# Patient Record
Sex: FEMALE | Race: BLACK OR AFRICAN AMERICAN | NOT HISPANIC OR LATINO | Employment: UNEMPLOYED | URBAN - METROPOLITAN AREA
[De-identification: names, ages, dates, MRNs, and addresses within clinical notes are randomized per-mention and may not be internally consistent; named-entity substitution may affect disease eponyms.]

---

## 2023-06-29 ENCOUNTER — OFFICE VISIT (OUTPATIENT)
Dept: PRIMARY CARE | Facility: CLINIC | Age: 43
End: 2023-06-29
Payer: COMMERCIAL

## 2023-06-29 VITALS
WEIGHT: 220 LBS | BODY MASS INDEX: 33.95 KG/M2 | HEART RATE: 90 BPM | OXYGEN SATURATION: 97 % | SYSTOLIC BLOOD PRESSURE: 115 MMHG | DIASTOLIC BLOOD PRESSURE: 69 MMHG

## 2023-06-29 DIAGNOSIS — Z76.89 ESTABLISHING CARE WITH NEW DOCTOR, ENCOUNTER FOR: Primary | ICD-10-CM

## 2023-06-29 DIAGNOSIS — E11.9 TYPE 2 DIABETES MELLITUS WITHOUT COMPLICATION, WITHOUT LONG-TERM CURRENT USE OF INSULIN (MULTI): ICD-10-CM

## 2023-06-29 DIAGNOSIS — Z12.11 ENCOUNTER FOR SCREENING FOR MALIGNANT NEOPLASM OF COLON: ICD-10-CM

## 2023-06-29 DIAGNOSIS — E11.39 TYPE 2 DIABETES MELLITUS WITH OTHER OPHTHALMIC COMPLICATION, WITHOUT LONG-TERM CURRENT USE OF INSULIN (MULTI): ICD-10-CM

## 2023-06-29 DIAGNOSIS — Z00.00 ANNUAL PHYSICAL EXAM: ICD-10-CM

## 2023-06-29 LAB
ALANINE AMINOTRANSFERASE (SGPT) (U/L) IN SER/PLAS: 11 U/L (ref 7–45)
ALBUMIN (G/DL) IN SER/PLAS: 3.4 G/DL (ref 3.4–5)
ALKALINE PHOSPHATASE (U/L) IN SER/PLAS: 72 U/L (ref 33–110)
ANION GAP IN SER/PLAS: 9 MMOL/L (ref 10–20)
ASPARTATE AMINOTRANSFERASE (SGOT) (U/L) IN SER/PLAS: 9 U/L (ref 9–39)
BILIRUBIN TOTAL (MG/DL) IN SER/PLAS: 0.8 MG/DL (ref 0–1.2)
CALCIUM (MG/DL) IN SER/PLAS: 9.1 MG/DL (ref 8.6–10.6)
CARBON DIOXIDE, TOTAL (MMOL/L) IN SER/PLAS: 30 MMOL/L (ref 21–32)
CHLORIDE (MMOL/L) IN SER/PLAS: 101 MMOL/L (ref 98–107)
CHOLESTEROL (MG/DL) IN SER/PLAS: 201 MG/DL (ref 0–199)
CHOLESTEROL IN HDL (MG/DL) IN SER/PLAS: 47.1 MG/DL
CHOLESTEROL/HDL RATIO: 4.3
CREATININE (MG/DL) IN SER/PLAS: 0.53 MG/DL (ref 0.5–1.05)
ERYTHROCYTE DISTRIBUTION WIDTH (RATIO) BY AUTOMATED COUNT: 12.5 % (ref 11.5–14.5)
ERYTHROCYTE MEAN CORPUSCULAR HEMOGLOBIN CONCENTRATION (G/DL) BY AUTOMATED: 33.2 G/DL (ref 32–36)
ERYTHROCYTE MEAN CORPUSCULAR VOLUME (FL) BY AUTOMATED COUNT: 100 FL (ref 80–100)
ERYTHROCYTES (10*6/UL) IN BLOOD BY AUTOMATED COUNT: 4.59 X10E12/L (ref 4–5.2)
GFR FEMALE: >90 ML/MIN/1.73M2
GLUCOSE (MG/DL) IN SER/PLAS: 318 MG/DL (ref 74–99)
HEMATOCRIT (%) IN BLOOD BY AUTOMATED COUNT: 45.8 % (ref 36–46)
HEMOGLOBIN (G/DL) IN BLOOD: 15.2 G/DL (ref 12–16)
LDL: 134 MG/DL (ref 0–99)
LEUKOCYTES (10*3/UL) IN BLOOD BY AUTOMATED COUNT: 11 X10E9/L (ref 4.4–11.3)
NRBC (PER 100 WBCS) BY AUTOMATED COUNT: 0 /100 WBC (ref 0–0)
PLATELETS (10*3/UL) IN BLOOD AUTOMATED COUNT: 357 X10E9/L (ref 150–450)
POC HEMOGLOBIN A1C: 12.6 % (ref 4.2–6.5)
POTASSIUM (MMOL/L) IN SER/PLAS: 4.6 MMOL/L (ref 3.5–5.3)
PROTEIN TOTAL: 5.7 G/DL (ref 6.4–8.2)
SODIUM (MMOL/L) IN SER/PLAS: 135 MMOL/L (ref 136–145)
THYROTROPIN (MIU/L) IN SER/PLAS BY DETECTION LIMIT <= 0.05 MIU/L: 0.85 MIU/L (ref 0.44–3.98)
TRIGLYCERIDE (MG/DL) IN SER/PLAS: 102 MG/DL (ref 0–149)
UREA NITROGEN (MG/DL) IN SER/PLAS: 7 MG/DL (ref 6–23)
VLDL: 20 MG/DL (ref 0–40)

## 2023-06-29 PROCEDURE — 80061 LIPID PANEL: CPT

## 2023-06-29 PROCEDURE — 3074F SYST BP LT 130 MM HG: CPT | Performed by: STUDENT IN AN ORGANIZED HEALTH CARE EDUCATION/TRAINING PROGRAM

## 2023-06-29 PROCEDURE — 83036 HEMOGLOBIN GLYCOSYLATED A1C: CPT | Performed by: STUDENT IN AN ORGANIZED HEALTH CARE EDUCATION/TRAINING PROGRAM

## 2023-06-29 PROCEDURE — 84443 ASSAY THYROID STIM HORMONE: CPT

## 2023-06-29 PROCEDURE — 3078F DIAST BP <80 MM HG: CPT | Performed by: STUDENT IN AN ORGANIZED HEALTH CARE EDUCATION/TRAINING PROGRAM

## 2023-06-29 PROCEDURE — 85027 COMPLETE CBC AUTOMATED: CPT

## 2023-06-29 PROCEDURE — 80053 COMPREHEN METABOLIC PANEL: CPT

## 2023-06-29 PROCEDURE — 99214 OFFICE O/P EST MOD 30 MIN: CPT | Performed by: STUDENT IN AN ORGANIZED HEALTH CARE EDUCATION/TRAINING PROGRAM

## 2023-06-29 PROCEDURE — 99386 PREV VISIT NEW AGE 40-64: CPT | Performed by: STUDENT IN AN ORGANIZED HEALTH CARE EDUCATION/TRAINING PROGRAM

## 2023-06-29 RX ORDER — GLIPIZIDE 5 MG/1
1 TABLET, FILM COATED, EXTENDED RELEASE ORAL
COMMUNITY
Start: 2020-03-05 | End: 2023-06-30 | Stop reason: SDUPTHER

## 2023-06-29 ASSESSMENT — PATIENT HEALTH QUESTIONNAIRE - PHQ9
1. LITTLE INTEREST OR PLEASURE IN DOING THINGS: NOT AT ALL
SUM OF ALL RESPONSES TO PHQ9 QUESTIONS 1 AND 2: 0
2. FEELING DOWN, DEPRESSED OR HOPELESS: NOT AT ALL

## 2023-06-29 NOTE — PROGRESS NOTES
Subjective   Patient ID: Albert Gonzales is a 42 y.o. female who presents for Med Refill.    HPI     Albert Gonzales is a 42 y.o. female who is presenting for annual physical exam.     Last  Visit:   Reported Health: Good    Dental, Vision, Hearing:  Regular dental visits: no  - Brushes teeth 2 times per day  - Flosses? no  Vision problems: yes  - Wears glasses or contacts? no  - Last eye exam:  scheduled  Hearing loss: no    Immunization status:  Up to date: yes    Lifestyle:  Healthy diet: yes  Regular exercise: yes  - Exercise frequency:   - Type of exercise: walking, and stairs  Alcohol: yes socially  Tobacco: yes 1/2 ppd  Drugs: yes marijuana, rarely    Reproductive Health:  Menstrual problems: no  - LMP:  2023   Sexually active: yes  Contraception:  none    Pregnancy History:   :   Parity:   - Full term: 2  - Premature: 0  - (s): 3  - Livin  - AB Induced:2  - AB Spont:1  - Ectopic: 0   - Multiple births:0  1 child high risk with gestational diabetes  Cervical Cancer Screening:  Last pap: unknown date more than 5 years  ~  History of abnormal pap smear? no  Breast Cancer Screening:  Last mammogram:  n/a  Colorectal Cancer Screening:  Last colonoscopy or Cologuard:  n/a  family history of colon cancer  Metabolic Screening:  Lipid profile:   Glucose screen:       Previous History  Past Medical History:   Diagnosis Date    Chlamydial infection, unspecified     Chlamydia    Complete or unspecified spontaneous  without complication         Maternal care for other (suspected) fetal abnormality and damage, fetal genitourinary anomalies, not applicable or unspecified     Ultrasound recheck of fetal pyelectasis, antepartum    Other conditions influencing health status     History of pregnancy    Personal history of gestational diabetes     History of gestational diabetes    Personal history of other complications of pregnancy, childbirth and the puerperium     History  of spontaneous     Personal history of other endocrine, nutritional and metabolic disease     History of obesity    Personal history of other infectious and parasitic diseases     History of sexually transmitted disease    Personal history of other infectious and parasitic diseases     History of trichomoniasis    Personal history of other specified conditions     History of headache    Smoking (tobacco) complicating pregnancy, unspecified trimester     Tobacco use in pregnancy     Past Surgical History:   Procedure Laterality Date    OTHER SURGICAL HISTORY  2014    Laparoscopy With Occlusion Of Oviducts By Device     Social History     Tobacco Use    Smoking status: Every Day     Types: Cigarettes    Smokeless tobacco: Never   Substance Use Topics    Alcohol use: Yes    Drug use: Yes     Types: Marijuana     No family history on file.  Allergies   Allergen Reactions    Vancomycin Hives and Unknown     Current Outpatient Medications   Medication Instructions    glipiZIDE XL (Glucotrol XL) 5 mg 24 hr tablet 1 tablet, oral, Daily with breakfast             Review of Systems   Eyes:         Vision loss       Objective   /69   Pulse 90   Wt 99.8 kg (220 lb)   SpO2 97%   BMI 33.95 kg/m²     Physical Exam  Constitutional:       Appearance: She is obese.   HENT:      Head: Normocephalic and atraumatic.      Right Ear: Tympanic membrane, ear canal and external ear normal.      Left Ear: Tympanic membrane, ear canal and external ear normal.      Nose: Nose normal. No congestion or rhinorrhea.      Mouth/Throat:      Mouth: Mucous membranes are moist.      Pharynx: Oropharynx is clear.   Eyes:      General: No scleral icterus.     Extraocular Movements: Extraocular movements intact.      Conjunctiva/sclera: Conjunctivae normal.      Comments: Xanthomas on bilateral eyes   Cardiovascular:      Rate and Rhythm: Normal rate and regular rhythm.      Pulses: Normal pulses.      Heart sounds: Normal heart  sounds.   Pulmonary:      Effort: Pulmonary effort is normal.      Breath sounds: Normal breath sounds.   Abdominal:      General: Abdomen is flat.      Palpations: Abdomen is soft.   Musculoskeletal:         General: Normal range of motion.      Cervical back: Normal range of motion and neck supple.   Skin:     General: Skin is warm and dry.      Capillary Refill: Capillary refill takes less than 2 seconds.   Neurological:      General: No focal deficit present.      Mental Status: She is alert and oriented to person, place, and time. Mental status is at baseline.   Psychiatric:         Mood and Affect: Mood normal.         Behavior: Behavior normal.         Thought Content: Thought content normal.         Judgment: Judgment normal.         Assessment/Plan   Diagnoses and all orders for this visit:  Establishing care with new doctor, encounter for  Annual physical exam  -     CBC; Future  -     Comprehensive Metabolic Panel; Future  -     TSH with reflex to Free T4 if abnormal; Future  -     Lipid Panel; Future  Type 2 diabetes mellitus without complication, without long-term current use of insulin (CMS/[facility])  -     POCT glycosylated hemoglobin (Hb A1C) manually resulted  -     Comprehensive Metabolic Panel; Future  -     Lipid Panel; Future  -     Referral to Ophthalmology; Future  -     Referral to Podiatry; Future  -     Referral to Endocrinology; Future  Encounter for screening for malignant neoplasm of colon  -     Cologuard® colon cancer screening; Future  - family history of colon cancer in mother 3 years ago    IO A1c 12.6, uncontrolled diabetes  Given Jardiance 25mg 2 week sample supply   Given freestyle delphine samples today    TDAP due in December 2023  Pap test due to schedule with us    I have personally reviewed all available pertinent labs, imaging, and consult notes with the patient.   All questions and concerns were addressed. Patient verbalizes understanding instructions and agrees with established  plan of care.   Patient seen and discussed with Dr. Edd Sewell MD

## 2023-06-30 RX ORDER — FLASH GLUCOSE SENSOR
KIT MISCELLANEOUS
Qty: 4 EACH | Refills: 1 | Status: SHIPPED | OUTPATIENT
Start: 2023-06-30 | End: 2023-07-21 | Stop reason: SDUPTHER

## 2023-06-30 RX ORDER — GLIPIZIDE 5 MG/1
5 TABLET, FILM COATED, EXTENDED RELEASE ORAL
Qty: 90 TABLET | Refills: 1 | Status: SHIPPED | OUTPATIENT
Start: 2023-06-30 | End: 2023-07-21 | Stop reason: SINTOL

## 2023-06-30 NOTE — ADDENDUM NOTE
Addended by: ALLYSSA VALVERDE on: 6/30/2023 12:22 PM     Modules accepted: Orders, Level of Service

## 2023-07-07 ENCOUNTER — OFFICE VISIT (OUTPATIENT)
Dept: PRIMARY CARE | Facility: CLINIC | Age: 43
End: 2023-07-07
Payer: COMMERCIAL

## 2023-07-07 VITALS
DIASTOLIC BLOOD PRESSURE: 72 MMHG | HEIGHT: 67 IN | BODY MASS INDEX: 34.21 KG/M2 | SYSTOLIC BLOOD PRESSURE: 104 MMHG | WEIGHT: 218 LBS | OXYGEN SATURATION: 99 % | HEART RATE: 84 BPM

## 2023-07-07 DIAGNOSIS — E11.39 TYPE 2 DIABETES MELLITUS WITH OTHER OPHTHALMIC COMPLICATION, WITHOUT LONG-TERM CURRENT USE OF INSULIN (MULTI): Primary | ICD-10-CM

## 2023-07-07 PROCEDURE — 99214 OFFICE O/P EST MOD 30 MIN: CPT

## 2023-07-07 PROCEDURE — 3074F SYST BP LT 130 MM HG: CPT

## 2023-07-07 PROCEDURE — 3078F DIAST BP <80 MM HG: CPT

## 2023-07-07 RX ORDER — METFORMIN HYDROCHLORIDE 500 MG/1
1000 TABLET, EXTENDED RELEASE ORAL 2 TIMES DAILY
Qty: 360 TABLET | Refills: 0 | Status: SHIPPED | OUTPATIENT
Start: 2023-07-07 | End: 2023-10-03 | Stop reason: SDUPTHER

## 2023-07-07 RX ORDER — ROSUVASTATIN CALCIUM 5 MG/1
5 TABLET, COATED ORAL DAILY
Qty: 30 TABLET | Refills: 1 | Status: SHIPPED | OUTPATIENT
Start: 2023-07-07 | End: 2023-08-01

## 2023-07-07 NOTE — PROGRESS NOTES
"Subjective   Patient ID: Albert Gonzales is a 42 y.o. female who presents for lab follow up and DM2 follow up    HPI     Jardiance gave her yeast infx, took OTC for it   Samples 4 left. Is willing to try it against for another trial.  Cologuard refused without medical reasoning      Review of Systems   Genitourinary:         Yeast infx       Objective   /72 (BP Location: Right arm, Patient Position: Sitting)   Pulse 84   Ht 1.702 m (5' 7\")   Wt 98.9 kg (218 lb)   SpO2 99%   BMI 34.14 kg/m²     Physical Exam  Constitutional:       Appearance: Normal appearance.   HENT:      Head: Normocephalic and atraumatic.      Right Ear: External ear normal.      Left Ear: External ear normal.      Nose: Nose normal.   Eyes:      Extraocular Movements: Extraocular movements intact.      Conjunctiva/sclera: Conjunctivae normal.   Cardiovascular:      Rate and Rhythm: Normal rate and regular rhythm.      Pulses: Normal pulses.      Heart sounds: Normal heart sounds.   Pulmonary:      Effort: Pulmonary effort is normal.      Breath sounds: Normal breath sounds.   Musculoskeletal:         General: Normal range of motion.      Cervical back: Normal range of motion.   Skin:     Capillary Refill: Capillary refill takes less than 2 seconds.   Neurological:      General: No focal deficit present.      Mental Status: She is alert and oriented to person, place, and time.   Psychiatric:         Mood and Affect: Mood normal.         Behavior: Behavior normal.         Thought Content: Thought content normal.         Judgment: Judgment normal.         Assessment/Plan   Diagnoses and all orders for this visit:  Type 2 diabetes mellitus with other ophthalmic complication, without long-term current use of insulin (CMS/[facility])  -     empagliflozin (Jardiance) 25 mg; Take 1 tablet (25 mg) by mouth once daily.  -     metFORMIN XR (Glucophage-XR) 500 mg 24 hr tablet; Take 2 tablets (1,000 mg) by mouth 2 times a day. Do not crush, chew, or " split.  -     rosuvastatin (Crestor) 5 mg tablet; Take 1 tablet (5 mg) by mouth once daily.  Discussed the risk vs benefits with jardiance, she is willing to try for another two weeks. Should she have a yeast infection again we will discontinue.  Metformin will be started today, continue the glipizide.  Crestor started today due to DM2 diagnosis and elevated LDL  RTC in 2 weeks to evaluate the adherence and possible side effects to the new medications.  I have personally reviewed all available pertinent labs, imaging, and consult notes with the patient.     All questions and concerns were addressed. Patient verbalizes understanding instructions and agrees with established plan of care.     Patient seen and discussed with Dr. Edd Sewell MD

## 2023-07-17 PROBLEM — G89.29 CHRONIC LUMBAR PAIN: Status: ACTIVE | Noted: 2023-07-17

## 2023-07-17 PROBLEM — O99.810: Status: ACTIVE | Noted: 2023-07-17

## 2023-07-17 PROBLEM — M54.50 CHRONIC LUMBAR PAIN: Status: ACTIVE | Noted: 2023-07-17

## 2023-07-17 PROBLEM — M54.2 CHRONIC NECK PAIN: Status: ACTIVE | Noted: 2023-07-17

## 2023-07-17 PROBLEM — G89.29 CHRONIC NECK PAIN: Status: ACTIVE | Noted: 2023-07-17

## 2023-07-17 PROBLEM — J98.8 AIRWAY COMPROMISE: Status: ACTIVE | Noted: 2018-06-10

## 2023-07-17 PROBLEM — F17.200 TOBACCO DEPENDENCY: Status: ACTIVE | Noted: 2023-07-17

## 2023-07-17 PROBLEM — E11.69 TYPE 2 DIABETES MELLITUS WITH MORBID OBESITY (MULTI): Status: ACTIVE | Noted: 2021-06-07

## 2023-07-17 PROBLEM — E66.01 MORBID OBESITY (MULTI): Status: ACTIVE | Noted: 2021-06-07

## 2023-07-17 PROBLEM — K13.79 UVULAR SWELLING: Status: ACTIVE | Noted: 2018-06-02

## 2023-07-17 PROBLEM — E66.01 TYPE 2 DIABETES MELLITUS WITH MORBID OBESITY (MULTI): Status: ACTIVE | Noted: 2021-06-07

## 2023-07-17 RX ORDER — CYCLOBENZAPRINE HCL 10 MG
TABLET ORAL
COMMUNITY
Start: 2017-06-24 | End: 2023-10-03 | Stop reason: WASHOUT

## 2023-07-17 RX ORDER — ONDANSETRON 4 MG/1
TABLET, FILM COATED ORAL
COMMUNITY
Start: 2023-06-03 | End: 2023-10-03 | Stop reason: ALTCHOICE

## 2023-07-17 RX ORDER — TOPIRAMATE 25 MG/1
TABLET ORAL
COMMUNITY
Start: 2017-06-27 | End: 2023-10-03 | Stop reason: WASHOUT

## 2023-07-17 RX ORDER — MELOXICAM 7.5 MG/1
TABLET ORAL
COMMUNITY
Start: 2021-02-11 | End: 2023-10-03 | Stop reason: WASHOUT

## 2023-07-17 RX ORDER — BLOOD-GLUCOSE METER
EACH MISCELLANEOUS
COMMUNITY
Start: 2020-03-06 | End: 2024-01-03 | Stop reason: WASHOUT

## 2023-07-21 ENCOUNTER — OFFICE VISIT (OUTPATIENT)
Dept: PRIMARY CARE | Facility: CLINIC | Age: 43
End: 2023-07-21
Payer: COMMERCIAL

## 2023-07-21 VITALS
HEIGHT: 69 IN | DIASTOLIC BLOOD PRESSURE: 75 MMHG | SYSTOLIC BLOOD PRESSURE: 117 MMHG | OXYGEN SATURATION: 98 % | BODY MASS INDEX: 33.33 KG/M2 | WEIGHT: 225 LBS | HEART RATE: 79 BPM

## 2023-07-21 DIAGNOSIS — F17.219 CIGARETTE NICOTINE DEPENDENCE WITH NICOTINE-INDUCED DISORDER: ICD-10-CM

## 2023-07-21 DIAGNOSIS — E11.39 TYPE 2 DIABETES MELLITUS WITH OTHER OPHTHALMIC COMPLICATION, WITHOUT LONG-TERM CURRENT USE OF INSULIN (MULTI): Primary | ICD-10-CM

## 2023-07-21 DIAGNOSIS — E11.9 TYPE 2 DIABETES MELLITUS WITHOUT COMPLICATION, WITHOUT LONG-TERM CURRENT USE OF INSULIN (MULTI): ICD-10-CM

## 2023-07-21 PROCEDURE — 3074F SYST BP LT 130 MM HG: CPT

## 2023-07-21 PROCEDURE — 99214 OFFICE O/P EST MOD 30 MIN: CPT

## 2023-07-21 PROCEDURE — 3078F DIAST BP <80 MM HG: CPT

## 2023-07-21 RX ORDER — ROSUVASTATIN CALCIUM 5 MG/1
5 TABLET, COATED ORAL DAILY
Qty: 30 TABLET | Refills: 1 | Status: CANCELLED | OUTPATIENT
Start: 2023-07-21 | End: 2023-09-19

## 2023-07-21 RX ORDER — FLASH GLUCOSE SENSOR
KIT MISCELLANEOUS
Qty: 4 EACH | Refills: 11 | Status: SHIPPED | OUTPATIENT
Start: 2023-07-21

## 2023-07-21 NOTE — PROGRESS NOTES
"Subjective   Patient ID: Albert Gonzales is a 42 y.o. female who presents for Follow-up.    HPI     No side effects  from medications no more yeast infections, learning from alarms about sugars freestyle libres  She has noticed low blood sugar readings during the day and night that resolve after drinking juice  She is being well managed with the medication regimen currently     Review of Systems   All other systems reviewed and are negative.      Objective   /75   Pulse 79   Ht 1.753 m (5' 9\")   Wt 102 kg (225 lb)   SpO2 98%   BMI 33.23 kg/m²     Physical Exam  Constitutional:       Appearance: Normal appearance.   HENT:      Head: Normocephalic and atraumatic.      Right Ear: External ear normal.      Left Ear: External ear normal.      Nose: Nose normal.   Eyes:      Extraocular Movements: Extraocular movements intact.      Conjunctiva/sclera: Conjunctivae normal.   Pulmonary:      Effort: Pulmonary effort is normal.   Musculoskeletal:         General: Normal range of motion.      Cervical back: Normal range of motion.   Neurological:      General: No focal deficit present.      Mental Status: She is alert and oriented to person, place, and time.   Psychiatric:         Mood and Affect: Mood normal.         Behavior: Behavior normal.         Thought Content: Thought content normal.         Judgment: Judgment normal.       Assessment/Plan   Diagnoses and all orders for this visit:  Type 2 diabetes mellitus with other ophthalmic complication, without long-term current use of insulin (CMS/HCC)  -     empagliflozin (Jardiance) 25 mg; Take 1 tablet (25 mg) by mouth once daily.  Cigarette nicotine dependence with nicotine-induced disorder  Type 2 diabetes mellitus without complication, without long-term current use of insulin (CMS/HCC)  -     FreeStyle Harry sensor system (FreeStyle Harry 2 Sensor) kit; Test as needed.    Rybelsus, 3mg and Jardiance 25mg samples given today  Check A1c and labs in " September  Discontinue Glipizide for concerns of hypoglycemia readings    I have personally reviewed all available pertinent labs, imaging, and consult notes with the patient.     All questions and concerns were addressed. Patient verbalizes understanding instructions and agrees with established plan of care.     Patient seen and discussed with Dr. Edd Sewell MD

## 2023-07-29 DIAGNOSIS — E11.39 TYPE 2 DIABETES MELLITUS WITH OTHER OPHTHALMIC COMPLICATION, WITHOUT LONG-TERM CURRENT USE OF INSULIN (MULTI): ICD-10-CM

## 2023-08-01 RX ORDER — ROSUVASTATIN CALCIUM 5 MG/1
5 TABLET, COATED ORAL DAILY
Qty: 30 TABLET | Refills: 1 | Status: SHIPPED | OUTPATIENT
Start: 2023-08-01 | End: 2023-08-29

## 2023-08-08 ENCOUNTER — OFFICE VISIT (OUTPATIENT)
Dept: PRIMARY CARE | Facility: CLINIC | Age: 43
End: 2023-08-08
Payer: COMMERCIAL

## 2023-08-08 VITALS
DIASTOLIC BLOOD PRESSURE: 77 MMHG | WEIGHT: 220 LBS | HEIGHT: 69 IN | SYSTOLIC BLOOD PRESSURE: 118 MMHG | HEART RATE: 89 BPM | BODY MASS INDEX: 32.58 KG/M2

## 2023-08-08 DIAGNOSIS — E66.01 TYPE 2 DIABETES MELLITUS WITH MORBID OBESITY (MULTI): Primary | ICD-10-CM

## 2023-08-08 DIAGNOSIS — E11.69 TYPE 2 DIABETES MELLITUS WITH MORBID OBESITY (MULTI): Primary | ICD-10-CM

## 2023-08-08 PROCEDURE — 3074F SYST BP LT 130 MM HG: CPT | Performed by: STUDENT IN AN ORGANIZED HEALTH CARE EDUCATION/TRAINING PROGRAM

## 2023-08-08 PROCEDURE — 3078F DIAST BP <80 MM HG: CPT | Performed by: STUDENT IN AN ORGANIZED HEALTH CARE EDUCATION/TRAINING PROGRAM

## 2023-08-08 PROCEDURE — 99213 OFFICE O/P EST LOW 20 MIN: CPT | Performed by: STUDENT IN AN ORGANIZED HEALTH CARE EDUCATION/TRAINING PROGRAM

## 2023-08-09 ASSESSMENT — ENCOUNTER SYMPTOMS
FEVER: 0
CHILLS: 0
HEADACHES: 0
PALPITATIONS: 0
ARTHRALGIAS: 0
RHINORRHEA: 0
VOMITING: 0
WHEEZING: 0
SORE THROAT: 0
WOUND: 0
DYSPHORIC MOOD: 0
FREQUENCY: 0
LIGHT-HEADEDNESS: 0
DIARRHEA: 0
DYSURIA: 0
COUGH: 0
NERVOUS/ANXIOUS: 0
CONSTIPATION: 0
MYALGIAS: 0
FATIGUE: 0
SHORTNESS OF BREATH: 0
NAUSEA: 0

## 2023-08-10 NOTE — PROGRESS NOTES
"Subjective   Patient ID: Albert Gonzales is a 42 y.o. female with past medical history of who DM2 presents for Follow-up.    Patient here today for follow up.  Currently taking Rybelsus 3 mg starter pack.  Denies any adverse effects from this medication.         Review of Systems   Constitutional:  Negative for chills, fatigue and fever.   HENT:  Negative for congestion, rhinorrhea and sore throat.    Respiratory:  Negative for cough, shortness of breath and wheezing.    Cardiovascular:  Negative for chest pain, palpitations and leg swelling.   Gastrointestinal:  Negative for constipation, diarrhea, nausea and vomiting.   Genitourinary:  Negative for dysuria, frequency and urgency.   Musculoskeletal:  Negative for arthralgias and myalgias.   Skin:  Negative for pallor, rash and wound.   Neurological:  Negative for light-headedness and headaches.   Psychiatric/Behavioral:  Negative for dysphoric mood. The patient is not nervous/anxious.        Objective     Visit Vitals  /77   Pulse 89   Ht 1.753 m (5' 9\")   Wt 99.8 kg (220 lb)   BMI 32.49 kg/m²   Smoking Status Every Day   BSA 2.2 m²         Physical Exam  Constitutional:       Appearance: Normal appearance.   HENT:      Head: Normocephalic and atraumatic.      Right Ear: External ear normal.      Left Ear: External ear normal.      Nose: Nose normal.      Mouth/Throat:      Mouth: Mucous membranes are moist.      Pharynx: Oropharynx is clear.   Eyes:      Extraocular Movements: Extraocular movements intact.      Conjunctiva/sclera: Conjunctivae normal.   Cardiovascular:      Rate and Rhythm: Normal rate and regular rhythm.      Pulses: Normal pulses.      Heart sounds: Normal heart sounds.   Pulmonary:      Effort: Pulmonary effort is normal.      Breath sounds: Normal breath sounds.   Abdominal:      General: There is no distension.      Palpations: Abdomen is soft.      Tenderness: There is no abdominal tenderness.   Skin:     General: Skin is warm and dry.     "  Capillary Refill: Capillary refill takes less than 2 seconds.   Neurological:      Mental Status: She is alert and oriented to person, place, and time. Mental status is at baseline.   Psychiatric:         Mood and Affect: Mood normal.         Behavior: Behavior normal.         Assessment/Plan   Problem List Items Addressed This Visit       Type 2 diabetes mellitus with morbid obesity (CMS/HCC) - Primary   Tolerating Rybelsus well.  Continue 3mg until starter pack completed.   Plan to increase to 7 mg daily at end of starter pack.     Patient seen and discussed with attending physician, Dr Edd Boyle, DO PGY3  Family Medicine

## 2023-08-18 ENCOUNTER — APPOINTMENT (OUTPATIENT)
Dept: PRIMARY CARE | Facility: CLINIC | Age: 43
End: 2023-08-18
Payer: COMMERCIAL

## 2023-08-25 ENCOUNTER — OFFICE VISIT (OUTPATIENT)
Dept: PRIMARY CARE | Facility: CLINIC | Age: 43
End: 2023-08-25
Payer: COMMERCIAL

## 2023-08-25 VITALS
SYSTOLIC BLOOD PRESSURE: 125 MMHG | HEIGHT: 69 IN | HEART RATE: 89 BPM | DIASTOLIC BLOOD PRESSURE: 77 MMHG | WEIGHT: 227 LBS | BODY MASS INDEX: 33.62 KG/M2

## 2023-08-25 DIAGNOSIS — E11.69 TYPE 2 DIABETES MELLITUS WITH MORBID OBESITY (MULTI): Primary | ICD-10-CM

## 2023-08-25 DIAGNOSIS — E66.01 TYPE 2 DIABETES MELLITUS WITH MORBID OBESITY (MULTI): Primary | ICD-10-CM

## 2023-08-25 PROCEDURE — 99213 OFFICE O/P EST LOW 20 MIN: CPT

## 2023-08-25 PROCEDURE — 3078F DIAST BP <80 MM HG: CPT

## 2023-08-25 PROCEDURE — 3074F SYST BP LT 130 MM HG: CPT

## 2023-08-25 RX ORDER — ORAL SEMAGLUTIDE 7 MG/1
7 TABLET ORAL DAILY
Qty: 30 TABLET | Refills: 1 | Status: SHIPPED | OUTPATIENT
Start: 2023-08-25 | End: 2023-10-03

## 2023-08-25 NOTE — PROGRESS NOTES
"Subjective   Patient ID: Albert Gonzales is a 43 y.o. female who presents for Follow-up.    HPI     Rybelsus sample given at last visit, no side effects at this time.  No more UTI/Yeast infx  Will recheck A1c last one was 12 .8  Freestyle delphine shows glucose average 90s    Review of Systems   All other systems reviewed and are negative.      Objective   /77   Pulse 89   Ht 1.753 m (5' 9\")   Wt 103 kg (227 lb)   BMI 33.52 kg/m²     Physical Exam  Constitutional:       Appearance: Normal appearance.   HENT:      Head: Normocephalic and atraumatic.      Right Ear: External ear normal.      Left Ear: External ear normal.      Nose: Nose normal. No congestion or rhinorrhea.   Eyes:      General: No scleral icterus.     Extraocular Movements: Extraocular movements intact.      Conjunctiva/sclera: Conjunctivae normal.   Cardiovascular:      Rate and Rhythm: Normal rate and regular rhythm.      Pulses: Normal pulses.      Heart sounds: Normal heart sounds.   Pulmonary:      Effort: Pulmonary effort is normal.      Breath sounds: Normal breath sounds.   Musculoskeletal:         General: Normal range of motion.      Cervical back: Normal range of motion and neck supple.   Skin:     General: Skin is warm and dry.      Capillary Refill: Capillary refill takes less than 2 seconds.   Neurological:      General: No focal deficit present.      Mental Status: She is alert and oriented to person, place, and time. Mental status is at baseline.   Psychiatric:         Mood and Affect: Mood normal.         Behavior: Behavior normal.         Thought Content: Thought content normal.         Judgment: Judgment normal.         Assessment/Plan   Diagnoses and all orders for this visit:  Type 2 diabetes mellitus with morbid obesity (CMS/[facility])  -     semaglutide (Rybelsus) 7 mg tablet; Take 1 tablet (7 mg) by mouth once daily.    A1c check next visit  Freestyle Delphine 2 sample given today      I have personally reviewed all available " pertinent labs, imaging, and consult notes with the patient.     All questions and concerns were addressed. Patient verbalizes understanding instructions and agrees with established plan of care.     Patient seen and discussed with Dr. Puga.  Anh Sewell MD

## 2023-08-29 DIAGNOSIS — E11.39 TYPE 2 DIABETES MELLITUS WITH OTHER OPHTHALMIC COMPLICATION, WITHOUT LONG-TERM CURRENT USE OF INSULIN (MULTI): ICD-10-CM

## 2023-08-29 RX ORDER — ROSUVASTATIN CALCIUM 5 MG/1
5 TABLET, COATED ORAL DAILY
Qty: 30 TABLET | Refills: 1 | Status: SHIPPED | OUTPATIENT
Start: 2023-08-29 | End: 2023-10-02

## 2023-09-05 ENCOUNTER — TELEPHONE (OUTPATIENT)
Dept: PRIMARY CARE | Facility: CLINIC | Age: 43
End: 2023-09-05
Payer: COMMERCIAL

## 2023-09-05 NOTE — TELEPHONE ENCOUNTER
Pt called bc she was given rebelsus and states its not covered and needs a PA; do you want to PA or change?

## 2023-09-28 DIAGNOSIS — E11.39 TYPE 2 DIABETES MELLITUS WITH OTHER OPHTHALMIC COMPLICATION, WITHOUT LONG-TERM CURRENT USE OF INSULIN (MULTI): ICD-10-CM

## 2023-10-02 RX ORDER — ROSUVASTATIN CALCIUM 5 MG/1
5 TABLET, COATED ORAL DAILY
Qty: 30 TABLET | Refills: 1 | Status: SHIPPED | OUTPATIENT
Start: 2023-10-02 | End: 2023-10-03 | Stop reason: SDUPTHER

## 2023-10-03 ENCOUNTER — OFFICE VISIT (OUTPATIENT)
Dept: PRIMARY CARE | Facility: CLINIC | Age: 43
End: 2023-10-03
Payer: COMMERCIAL

## 2023-10-03 VITALS
BODY MASS INDEX: 33.44 KG/M2 | HEIGHT: 69 IN | WEIGHT: 225.8 LBS | OXYGEN SATURATION: 100 % | HEART RATE: 93 BPM | SYSTOLIC BLOOD PRESSURE: 133 MMHG | DIASTOLIC BLOOD PRESSURE: 87 MMHG

## 2023-10-03 DIAGNOSIS — E11.69 TYPE 2 DIABETES MELLITUS WITH MORBID OBESITY (MULTI): Primary | ICD-10-CM

## 2023-10-03 DIAGNOSIS — E11.39 TYPE 2 DIABETES MELLITUS WITH OTHER OPHTHALMIC COMPLICATION, WITHOUT LONG-TERM CURRENT USE OF INSULIN (MULTI): ICD-10-CM

## 2023-10-03 DIAGNOSIS — E66.01 TYPE 2 DIABETES MELLITUS WITH MORBID OBESITY (MULTI): Primary | ICD-10-CM

## 2023-10-03 PROBLEM — O99.810: Status: RESOLVED | Noted: 2023-07-17 | Resolved: 2023-10-03

## 2023-10-03 PROBLEM — K13.79 UVULAR SWELLING: Status: RESOLVED | Noted: 2018-06-02 | Resolved: 2023-10-03

## 2023-10-03 PROBLEM — J98.8 AIRWAY COMPROMISE: Status: RESOLVED | Noted: 2018-06-10 | Resolved: 2023-10-03

## 2023-10-03 LAB
CREAT UR-MCNC: 80.2 MG/DL (ref 20–320)
MICROALBUMIN UR-MCNC: 37.6 MG/L
MICROALBUMIN/CREAT UR: 46.9 UG/MG CREAT
POC HEMOGLOBIN A1C: 6.2 % (ref 4.2–6.5)

## 2023-10-03 PROCEDURE — 83036 HEMOGLOBIN GLYCOSYLATED A1C: CPT | Performed by: NURSE PRACTITIONER

## 2023-10-03 PROCEDURE — 4010F ACE/ARB THERAPY RXD/TAKEN: CPT | Performed by: NURSE PRACTITIONER

## 2023-10-03 PROCEDURE — 3075F SYST BP GE 130 - 139MM HG: CPT | Performed by: NURSE PRACTITIONER

## 2023-10-03 PROCEDURE — 3079F DIAST BP 80-89 MM HG: CPT | Performed by: NURSE PRACTITIONER

## 2023-10-03 PROCEDURE — 99213 OFFICE O/P EST LOW 20 MIN: CPT | Performed by: NURSE PRACTITIONER

## 2023-10-03 RX ORDER — METFORMIN HYDROCHLORIDE 1000 MG/1
1000 TABLET, FILM COATED, EXTENDED RELEASE ORAL 2 TIMES DAILY
Qty: 180 TABLET | Refills: 3 | Status: SHIPPED | OUTPATIENT
Start: 2023-10-03 | End: 2023-11-07

## 2023-10-03 RX ORDER — ROSUVASTATIN CALCIUM 5 MG/1
5 TABLET, COATED ORAL DAILY
Qty: 90 TABLET | Refills: 3 | Status: SHIPPED | OUTPATIENT
Start: 2023-10-03

## 2023-10-03 RX ORDER — LISINOPRIL 2.5 MG/1
2.5 TABLET ORAL DAILY
Qty: 90 TABLET | Refills: 1 | Status: SHIPPED | OUTPATIENT
Start: 2023-10-03 | End: 2024-04-03 | Stop reason: SINTOL

## 2023-10-03 NOTE — PROGRESS NOTES
"Subjective   Patient ID: Albert Gonzales is a 43 y.o. female who presents for Med Refill (Pt needs refill for metformin. Pt states she can not afford Rubelsus and would like to know what else she can take. ) and a1c (A1c check ).    HPI     Has been checking her blood sugar throughout the day with Freestyle Harry. She has not had any readings above 200 in about a month. When it did run that high, she had been on vacation and forgot her medication.     She has not been taking Rybelsus since she her insurance didn't cover it. She has been taking Glipizide that she restarted herself for the last month. This was previously stopped to hypoglycemic episodes. Has gotten some readings in the 60s in the middle of the night, about twice per week.     States her diet is okay. She has been cutting back on the sugary drinks. Has been limiting her portions and is snacking less. She has been trying to be more active as well.       Review of Systems  ROS negative except as noted above in HPI.       Objective   /87   Pulse 93   Ht 1.753 m (5' 9\")   Wt 102 kg (225 lb 12.8 oz)   SpO2 100%   BMI 33.34 kg/m²     Physical Exam  General: Alert and oriented, in no acute distress. Appears stated age, well-nourished, and well hydrated  HEENT:  - Head: Normocephalic and atraumatic   - Eyes: EOMI, PERRLA  - ENT: Hearing grossly intact  Heart: RRR. No murmurs, clicks, or rubs  Lungs: Unlabored breathing. CTAB with no crackles, wheezes, or rhonchi  Abdomen: Normal BS in all 4 quadrants. Soft, non-tender, non-distended, with no masses  Extremities: Warm and well perfused. No edema. Normal peripheral pulses  Musculoskeletal: Normal gait and station  Neurological: Alert and oriented. No gross neurological deficits  Psychological: Appropriate mood and affect  Skin: No rash, abnormal lesions, cyanosis, or erythema      Assessment/Plan   T2DM  - IO HgA1c 6.2% (improved greatly from >12%)  - Rybelsus not covered, does not want to try an " injectables  - Has been taking Glipizide 5 mg every day for the last month, getting hypoglycemic episodes about twice per week into the 60s  - Stop Glipizide  - Continue Jardiance 25 mg every day and metformin 1000 mg BID   - Call if getting readings >140  - Check urine albumin   - Start lisinopril 2.5 mg every day for renoprotection  - Continue rosuvastatin 5 mg every day for ASVCD risk    Declined flu vaccine  RTC in 3 months or sooner MARCO ANTONIO Berry-CNP  [facility]

## 2023-10-09 DIAGNOSIS — E11.39 TYPE 2 DIABETES MELLITUS WITH OTHER OPHTHALMIC COMPLICATION, WITHOUT LONG-TERM CURRENT USE OF INSULIN (MULTI): ICD-10-CM

## 2023-10-26 ENCOUNTER — HOSPITAL ENCOUNTER (EMERGENCY)
Facility: HOSPITAL | Age: 43
Discharge: HOME | End: 2023-10-26
Payer: COMMERCIAL

## 2023-10-26 ENCOUNTER — APPOINTMENT (OUTPATIENT)
Dept: RADIOLOGY | Facility: HOSPITAL | Age: 43
End: 2023-10-26
Payer: COMMERCIAL

## 2023-10-26 VITALS
DIASTOLIC BLOOD PRESSURE: 88 MMHG | SYSTOLIC BLOOD PRESSURE: 138 MMHG | WEIGHT: 225 LBS | BODY MASS INDEX: 33.33 KG/M2 | HEART RATE: 74 BPM | HEIGHT: 69 IN | TEMPERATURE: 97.3 F | RESPIRATION RATE: 14 BRPM | OXYGEN SATURATION: 100 %

## 2023-10-26 DIAGNOSIS — S16.1XXA ACUTE STRAIN OF NECK MUSCLE, INITIAL ENCOUNTER: Primary | ICD-10-CM

## 2023-10-26 PROCEDURE — 2500000001 HC RX 250 WO HCPCS SELF ADMINISTERED DRUGS (ALT 637 FOR MEDICARE OP): Performed by: PHYSICIAN ASSISTANT

## 2023-10-26 PROCEDURE — 99283 EMERGENCY DEPT VISIT LOW MDM: CPT

## 2023-10-26 PROCEDURE — 72050 X-RAY EXAM NECK SPINE 4/5VWS: CPT

## 2023-10-26 PROCEDURE — 72050 X-RAY EXAM NECK SPINE 4/5VWS: CPT | Performed by: RADIOLOGY

## 2023-10-26 RX ORDER — CYCLOBENZAPRINE HCL 5 MG
5 TABLET ORAL ONCE
Status: COMPLETED | OUTPATIENT
Start: 2023-10-26 | End: 2023-10-26

## 2023-10-26 RX ORDER — CYCLOBENZAPRINE HCL 10 MG
10 TABLET ORAL 2 TIMES DAILY PRN
Qty: 20 TABLET | Refills: 0 | Status: SHIPPED | OUTPATIENT
Start: 2023-10-26 | End: 2024-01-03 | Stop reason: WASHOUT

## 2023-10-26 RX ADMIN — CYCLOBENZAPRINE HYDROCHLORIDE 5 MG: 5 TABLET, FILM COATED ORAL at 05:31

## 2023-10-26 ASSESSMENT — COLUMBIA-SUICIDE SEVERITY RATING SCALE - C-SSRS
1. IN THE PAST MONTH, HAVE YOU WISHED YOU WERE DEAD OR WISHED YOU COULD GO TO SLEEP AND NOT WAKE UP?: NO
6. HAVE YOU EVER DONE ANYTHING, STARTED TO DO ANYTHING, OR PREPARED TO DO ANYTHING TO END YOUR LIFE?: NO
2. HAVE YOU ACTUALLY HAD ANY THOUGHTS OF KILLING YOURSELF?: NO

## 2023-10-26 ASSESSMENT — PAIN DESCRIPTION - LOCATION: LOCATION: NECK

## 2023-10-26 ASSESSMENT — PAIN DESCRIPTION - ORIENTATION: ORIENTATION: LEFT

## 2023-10-26 ASSESSMENT — PAIN SCALES - GENERAL: PAINLEVEL_OUTOF10: 8

## 2023-10-26 ASSESSMENT — PAIN - FUNCTIONAL ASSESSMENT: PAIN_FUNCTIONAL_ASSESSMENT: 0-10

## 2023-10-26 NOTE — ED PROVIDER NOTES
EMERGENCY MEDICINE EVALUATION NOTE    History of Present Illness     Chief Complaint:   Chief Complaint   Patient presents with    Neck Pain     Pt arrived to from home pt states around 10-11pm  the pt noticed she injured her neck. Pt states she does not know what happened. But now it's hard for her to  her neck.pt describes it as still sharp pain with movement th pt states she used icy hot. Heating pad etc , but nothing is helping.       HPI: Albert Gonzales is a 43 y.o. female presents with a chief complaint of neck pain.  Patient reports that 2 days ago she started having pain on the left side of her neck.  Patient reports that she tried over-the-counter ibuprofen for symptoms was not helping.  Patient states that she feels like she has a hard time turning her neck left and right.  Patient reports that she is able to flex extend her neck down.  She denies any fevers or chills.  Patient has any chest pain or shortness of breath.  She denies any history of any neck pain or surgeries.  Patient denies any direct injury to the neck.  She states that she believes she may have turned it awkwardly when lifting something but she is not exactly sure.  She denies any numbness or tingling in her upper extremities.  Patient denies any medication allergies.    Previous History     Past Medical History:   Diagnosis Date    Chlamydial infection, unspecified     Chlamydia    Complete or unspecified spontaneous  without complication         Maternal care for other (suspected) fetal abnormality and damage, fetal genitourinary anomalies, not applicable or unspecified     Ultrasound recheck of fetal pyelectasis, antepartum    Other conditions influencing health status     History of pregnancy    Personal history of gestational diabetes     History of gestational diabetes    Personal history of other complications of pregnancy, childbirth and the puerperium     History of spontaneous     Personal history of  other endocrine, nutritional and metabolic disease     History of obesity    Personal history of other infectious and parasitic diseases     History of sexually transmitted disease    Personal history of other infectious and parasitic diseases     History of trichomoniasis    Personal history of other specified conditions     History of headache    Smoking (tobacco) complicating pregnancy, unspecified trimester     Tobacco use in pregnancy     Past Surgical History:   Procedure Laterality Date    OTHER SURGICAL HISTORY  04/08/2014    Laparoscopy With Occlusion Of Oviducts By Device     Social History     Tobacco Use    Smoking status: Every Day     Types: Cigarettes    Smokeless tobacco: Never   Substance Use Topics    Alcohol use: Yes    Drug use: Yes     Types: Marijuana     Family History   Problem Relation Name Age of Onset    Other (cardiac disorder) Mother      Hypertension Mother      Leukemia Brother      Diabetes Other      Stroke Other       Allergies   Allergen Reactions    Vancomycin Hives and Unknown     Current Outpatient Medications   Medication Instructions    blood sugar diagnostic (OneTouch Verio test strips) strip TEST TID    cyclobenzaprine (FLEXERIL) 10 mg, oral, 2 times daily PRN    empagliflozin (JARDIANCE) 25 mg, oral, Daily    FreeStyle Harry sensor system (FreeStyle Harry 2 Sensor) kit Test as needed.    lisinopril 2.5 mg, oral, Daily    metFORMIN (MOD) (GLUMETZA) 1,000 mg, oral, 2 times daily, Do not crush, chew, or split.    rosuvastatin (CRESTOR) 5 mg, oral, Daily       Physical Exam     Appearance: Alert, oriented , cooperative     Skin: Intact,  dry skin, no lesions, rash, petechiae or purpura.      Eyes: PERRLA, EOMs intact,  Conjunctiva pink      ENT: Hearing grossly intact. Pharynx clear     Neck: Supple. Trachea at midline.      Pulmonary: Clear bilaterally. No rales, rhonchi or wheezing. No accessory muscle use or stridor.     Cardiac: Normal rate and rhythm without murmur    "  Abdomen: Soft, nontender, active bowel sounds.     Musculoskeletal: Patient able to turn neck left and right however is limited due to pain.  Intact flexion extension of neck.  Diffuse left-sided cervical paraspinal tenderness.  No midline C-spine tenderness.     Neurological:Cranial nerves II through XII are grossly intact, normal sensation, no weakness, no focal findings identified.     Results   Labs Reviewed - No data to display  XR cervical spine complete 4-5 views   Final Result   No radiographic evidence of acute fracture or traumatic malalignment.   Radiopaque structures overlie the upper lip and within the left   periorbital soft tissues favored to represent piercings. Correlate   with clinical exam.        MACRO:   None.        Signed by: Evan Finkelstein 10/26/2023 5:24 AM   Dictation workstation:   [id number]            ED Course & Medical Decision Making     Medications   cyclobenzaprine (Flexeril) tablet 5 mg (5 mg oral Given 10/26/23 0531)     Heart Rate:  [77]   Temp:  [36.3 °C (97.3 °F)]   Resp:  [18]   BP: (142)/(92)   Height:  [175.3 cm (5' 9\")]   Weight:  [102 kg (225 lb)]   SpO2:  [100 %]    Diagnoses as of 10/26/23 0533   Acute strain of neck muscle, initial encounter     Albert Gonzales is a 43 y.o. female presents with a chief complaint of neck pain.  Patient reports that 2 days ago she started having pain on the left side of her neck.  Patient reports that she tried over-the-counter ibuprofen for symptoms was not helping.  Patient states that she feels like she has a hard time turning her neck left and right.  Patient reports that she is able to flex extend her neck down.  She denies any fevers or chills.  Patient has any chest pain or shortness of breath.  She denies any history of any neck pain or surgeries.  Patient denies any direct injury to the neck.  She states that she believes she may have turned it awkwardly when lifting something but she is not exactly sure.  Patient's work-up " included x-ray of the cervical spine which did not show any significant abnormalities.  On examination patient does have paraspinal tenderness and her symptoms are likely caused from a muscle strain.  Patient be discharged home at this time.  Patient will have Flexeril sent over to her pharmacy to take at home.  She instructed to follow-up with her primary care provider 1 to 2 days return here with any worsening symptoms.  Patient did not have any signs of nuchal rigidity I do not think patient has any signs of meningitis so I do not think a spinal tap is necessary at this time to rule that out as the cause of her neck pain.    Procedures   Procedures    Diagnosis     1. Acute strain of neck muscle, initial encounter        Disposition   Discharge    ED Prescriptions       Medication Sig Dispense Start Date End Date Auth. Provider    cyclobenzaprine (Flexeril) 10 mg tablet Take 1 tablet (10 mg) by mouth 2 times a day as needed for muscle spasms for up to 10 days. 20 tablet 10/26/2023 11/5/2023 Jose Berger PA-C            Disclaimer: This note was dictated by speech recognition. Minor errors in transcription may be present. Please call if questions.       Jose Berger PA-C  10/26/23 1988

## 2023-11-07 DIAGNOSIS — E11.69 TYPE 2 DIABETES MELLITUS WITH MORBID OBESITY (MULTI): Primary | ICD-10-CM

## 2023-11-07 DIAGNOSIS — E66.01 TYPE 2 DIABETES MELLITUS WITH MORBID OBESITY (MULTI): Primary | ICD-10-CM

## 2023-11-07 RX ORDER — METFORMIN HYDROCHLORIDE 500 MG/1
1000 TABLET, EXTENDED RELEASE ORAL
Qty: 120 TABLET | Refills: 11 | Status: SHIPPED | OUTPATIENT
Start: 2023-11-07 | End: 2024-11-06

## 2024-01-03 ENCOUNTER — OFFICE VISIT (OUTPATIENT)
Dept: PRIMARY CARE | Facility: CLINIC | Age: 44
End: 2024-01-03
Payer: COMMERCIAL

## 2024-01-03 VITALS
HEART RATE: 86 BPM | DIASTOLIC BLOOD PRESSURE: 85 MMHG | BODY MASS INDEX: 33.62 KG/M2 | SYSTOLIC BLOOD PRESSURE: 131 MMHG | WEIGHT: 227 LBS | HEIGHT: 69 IN

## 2024-01-03 DIAGNOSIS — E66.01 TYPE 2 DIABETES MELLITUS WITH MORBID OBESITY (MULTI): Primary | ICD-10-CM

## 2024-01-03 DIAGNOSIS — E11.69 TYPE 2 DIABETES MELLITUS WITH MORBID OBESITY (MULTI): Primary | ICD-10-CM

## 2024-01-03 DIAGNOSIS — R19.7 DIARRHEA, UNSPECIFIED TYPE: ICD-10-CM

## 2024-01-03 LAB — POC HEMOGLOBIN A1C: 6.8 % (ref 4.2–6.5)

## 2024-01-03 PROCEDURE — 3075F SYST BP GE 130 - 139MM HG: CPT | Performed by: NURSE PRACTITIONER

## 2024-01-03 PROCEDURE — 3079F DIAST BP 80-89 MM HG: CPT | Performed by: NURSE PRACTITIONER

## 2024-01-03 PROCEDURE — 4004F PT TOBACCO SCREEN RCVD TLK: CPT | Performed by: NURSE PRACTITIONER

## 2024-01-03 PROCEDURE — 99214 OFFICE O/P EST MOD 30 MIN: CPT | Performed by: NURSE PRACTITIONER

## 2024-01-03 PROCEDURE — 83036 HEMOGLOBIN GLYCOSYLATED A1C: CPT | Performed by: NURSE PRACTITIONER

## 2024-01-03 PROCEDURE — 4010F ACE/ARB THERAPY RXD/TAKEN: CPT | Performed by: NURSE PRACTITIONER

## 2024-01-03 NOTE — PROGRESS NOTES
"Subjective   Patient ID: Albert Gonzales is a 43 y.o. female who presents for Diabetes.    HPI     T2DM (CMS/[facility])  Last HgA1c: 6.2% on 10/3/23  Current meds: Jardiance 25 mg every day, metformin 1000 mg BID  Home glucose monitoring: multiple times per day   - High: 152  - Low: 58 (not often)  - Average: 113  Diet: fair   Exercise: active   Statin: rosuvastatin 5 mg every day   ACEI: lisinopril 2.5 mg every day   ASA: none  Last urine albumin: abnormal on 10/3/23  Diabetic foot exam: saw podiatry on 9/15/23   Vision exam: unknown, needs to schedule   Dental cleaning: years  Pneumovax: none    Reports blurry vision at night. Denies polyuria, polydipsia, vision changes, yeast infections, or neuropathy.      States she had diarrhea for about a week. This would occur in the mornings. Denies any fevers, chills, body aches, abdominal pain, nausea, vomiting, diarrhea, constipation, or blood in her stool. She has not had any diarrhea in a couple days.     Review of Systems  ROS negative except as noted above in HPI.       Objective   /85   Pulse 86   Ht 1.753 m (5' 9\")   Wt 103 kg (227 lb)   BMI 33.52 kg/m²     Physical Exam  General: Alert and oriented, in no acute distress. Appears stated age, well-nourished, and well hydrated  HEENT:  - Head: Normocephalic and atraumatic   - Eyes: EOMI, PERRLA  - ENT: Hearing grossly intact  Heart: RRR. No murmurs, clicks, or rubs  Lungs: Unlabored breathing. CTAB with no crackles, wheezes, or rhonchi  Abdomen: Normal BS in all 4 quadrants. Soft, non-tender, non-distended, with no masses  Extremities: Warm and well perfused. No edema. Normal peripheral pulses  Musculoskeletal: Normal gait and station  Neurological: Alert and oriented. No gross neurological deficits  Psychological: Appropriate mood and affect  Skin: No rash, abnormal lesions, cyanosis, or erythema      Assessment/Plan   T2DM (CMS/[facility])  - IO HgA1c: 6.6%  - Continue Jardiance 25 mg every day and Metformin 1000 mg " BID  - Continue to monitor glucose at home  - Continue rosuvastatin 5 mg every day for ASCVD risk  - Continue lisinopril 2.5 mg every day for renoprotection  - Urine albumin up to date  - Follows with podiatry; foot exam done in September 2023  - Schedule dental cleaning  - Schedule retinopathy screening  - Recommend pneumococcal vaccine (will need to get at pharmacy)  - Lifestyle management    Diarrhea  - One week of diarrhea in the mornings, no other symptoms  - Have now resolved    RTC in 3 months for physical with pap and T2DM    MARCO ANTONIO Smith-CNP  [facility]

## 2024-03-29 ENCOUNTER — APPOINTMENT (OUTPATIENT)
Dept: CARDIOLOGY | Facility: HOSPITAL | Age: 44
End: 2024-03-29
Payer: COMMERCIAL

## 2024-03-29 ENCOUNTER — APPOINTMENT (OUTPATIENT)
Dept: RADIOLOGY | Facility: HOSPITAL | Age: 44
End: 2024-03-29
Payer: COMMERCIAL

## 2024-03-29 ENCOUNTER — HOSPITAL ENCOUNTER (EMERGENCY)
Facility: HOSPITAL | Age: 44
Discharge: HOME | End: 2024-03-29
Attending: INTERNAL MEDICINE
Payer: COMMERCIAL

## 2024-03-29 VITALS
OXYGEN SATURATION: 98 % | RESPIRATION RATE: 15 BRPM | DIASTOLIC BLOOD PRESSURE: 80 MMHG | HEIGHT: 69 IN | TEMPERATURE: 98.2 F | BODY MASS INDEX: 33.18 KG/M2 | HEART RATE: 78 BPM | WEIGHT: 224 LBS | SYSTOLIC BLOOD PRESSURE: 128 MMHG

## 2024-03-29 DIAGNOSIS — E87.6 HYPOKALEMIA: ICD-10-CM

## 2024-03-29 DIAGNOSIS — R07.9 CHEST PAIN, UNSPECIFIED TYPE: Primary | ICD-10-CM

## 2024-03-29 LAB
ALBUMIN SERPL BCP-MCNC: 3.8 G/DL (ref 3.4–5)
ALP SERPL-CCNC: 56 U/L (ref 33–110)
ALT SERPL W P-5'-P-CCNC: 16 U/L (ref 7–45)
ANION GAP SERPL CALC-SCNC: 9 MMOL/L (ref 10–20)
AST SERPL W P-5'-P-CCNC: 13 U/L (ref 9–39)
ATRIAL RATE: 70 BPM
BASOPHILS # BLD AUTO: 0.02 X10*3/UL (ref 0–0.1)
BASOPHILS NFR BLD AUTO: 0.2 %
BILIRUB SERPL-MCNC: 0.7 MG/DL (ref 0–1.2)
BUN SERPL-MCNC: 13 MG/DL (ref 6–23)
CALCIUM SERPL-MCNC: 8.5 MG/DL (ref 8.6–10.3)
CARDIAC TROPONIN I PNL SERPL HS: 3 NG/L (ref 0–13)
CARDIAC TROPONIN I PNL SERPL HS: 3 NG/L (ref 0–13)
CHLORIDE SERPL-SCNC: 105 MMOL/L (ref 98–107)
CO2 SERPL-SCNC: 26 MMOL/L (ref 21–32)
CREAT SERPL-MCNC: 0.57 MG/DL (ref 0.5–1.05)
EGFRCR SERPLBLD CKD-EPI 2021: >90 ML/MIN/1.73M*2
EOSINOPHIL # BLD AUTO: 0.22 X10*3/UL (ref 0–0.7)
EOSINOPHIL NFR BLD AUTO: 2.6 %
ERYTHROCYTE [DISTWIDTH] IN BLOOD BY AUTOMATED COUNT: 13.8 % (ref 11.5–14.5)
FLUAV RNA RESP QL NAA+PROBE: NOT DETECTED
FLUBV RNA RESP QL NAA+PROBE: NOT DETECTED
GLUCOSE SERPL-MCNC: 137 MG/DL (ref 74–99)
HCT VFR BLD AUTO: 36 % (ref 36–46)
HGB BLD-MCNC: 12.1 G/DL (ref 12–16)
IMM GRANULOCYTES # BLD AUTO: 0.02 X10*3/UL (ref 0–0.7)
IMM GRANULOCYTES NFR BLD AUTO: 0.2 % (ref 0–0.9)
LIPASE SERPL-CCNC: 9 U/L (ref 9–82)
LYMPHOCYTES # BLD AUTO: 3 X10*3/UL (ref 1.2–4.8)
LYMPHOCYTES NFR BLD AUTO: 35.3 %
MAGNESIUM SERPL-MCNC: 1.9 MG/DL (ref 1.6–2.4)
MCH RBC QN AUTO: 33.1 PG (ref 26–34)
MCHC RBC AUTO-ENTMCNC: 33.6 G/DL (ref 32–36)
MCV RBC AUTO: 98 FL (ref 80–100)
MONOCYTES # BLD AUTO: 0.46 X10*3/UL (ref 0.1–1)
MONOCYTES NFR BLD AUTO: 5.4 %
NEUTROPHILS # BLD AUTO: 4.78 X10*3/UL (ref 1.2–7.7)
NEUTROPHILS NFR BLD AUTO: 56.3 %
NRBC BLD-RTO: 0 /100 WBCS (ref 0–0)
P AXIS: 64 DEGREES
P OFFSET: 181 MS
P ONSET: 126 MS
PLATELET # BLD AUTO: 293 X10*3/UL (ref 150–450)
POTASSIUM SERPL-SCNC: 3.2 MMOL/L (ref 3.5–5.3)
PR INTERVAL: 188 MS
PROT SERPL-MCNC: 6.6 G/DL (ref 6.4–8.2)
Q ONSET: 220 MS
QRS COUNT: 11 BEATS
QRS DURATION: 82 MS
QT INTERVAL: 394 MS
QTC CALCULATION(BAZETT): 425 MS
QTC FREDERICIA: 415 MS
R AXIS: 62 DEGREES
RBC # BLD AUTO: 3.66 X10*6/UL (ref 4–5.2)
SARS-COV-2 RNA RESP QL NAA+PROBE: NOT DETECTED
SODIUM SERPL-SCNC: 137 MMOL/L (ref 136–145)
T AXIS: 61 DEGREES
T OFFSET: 417 MS
VENTRICULAR RATE: 70 BPM
WBC # BLD AUTO: 8.5 X10*3/UL (ref 4.4–11.3)

## 2024-03-29 PROCEDURE — 99284 EMERGENCY DEPT VISIT MOD MDM: CPT | Mod: 25

## 2024-03-29 PROCEDURE — 2500000004 HC RX 250 GENERAL PHARMACY W/ HCPCS (ALT 636 FOR OP/ED): Performed by: SURGERY

## 2024-03-29 PROCEDURE — 84484 ASSAY OF TROPONIN QUANT: CPT | Performed by: SURGERY

## 2024-03-29 PROCEDURE — 96366 THER/PROPH/DIAG IV INF ADDON: CPT

## 2024-03-29 PROCEDURE — 2500000001 HC RX 250 WO HCPCS SELF ADMINISTERED DRUGS (ALT 637 FOR MEDICARE OP): Performed by: SURGERY

## 2024-03-29 PROCEDURE — 87636 SARSCOV2 & INF A&B AMP PRB: CPT | Performed by: SURGERY

## 2024-03-29 PROCEDURE — 71046 X-RAY EXAM CHEST 2 VIEWS: CPT

## 2024-03-29 PROCEDURE — 80053 COMPREHEN METABOLIC PANEL: CPT | Performed by: SURGERY

## 2024-03-29 PROCEDURE — 96365 THER/PROPH/DIAG IV INF INIT: CPT

## 2024-03-29 PROCEDURE — 83690 ASSAY OF LIPASE: CPT | Performed by: SURGERY

## 2024-03-29 PROCEDURE — 96375 TX/PRO/DX INJ NEW DRUG ADDON: CPT

## 2024-03-29 PROCEDURE — 71046 X-RAY EXAM CHEST 2 VIEWS: CPT | Performed by: RADIOLOGY

## 2024-03-29 PROCEDURE — 85025 COMPLETE CBC W/AUTO DIFF WBC: CPT | Performed by: SURGERY

## 2024-03-29 PROCEDURE — 83735 ASSAY OF MAGNESIUM: CPT | Performed by: SURGERY

## 2024-03-29 PROCEDURE — 93005 ELECTROCARDIOGRAM TRACING: CPT

## 2024-03-29 PROCEDURE — 36415 COLL VENOUS BLD VENIPUNCTURE: CPT | Performed by: SURGERY

## 2024-03-29 RX ORDER — POTASSIUM CHLORIDE 1.5 G/1.58G
20 POWDER, FOR SOLUTION ORAL ONCE
Status: COMPLETED | OUTPATIENT
Start: 2024-03-29 | End: 2024-03-29

## 2024-03-29 RX ORDER — KETOROLAC TROMETHAMINE 30 MG/ML
15 INJECTION, SOLUTION INTRAMUSCULAR; INTRAVENOUS ONCE
Status: COMPLETED | OUTPATIENT
Start: 2024-03-29 | End: 2024-03-29

## 2024-03-29 RX ORDER — POTASSIUM CHLORIDE 14.9 MG/ML
20 INJECTION INTRAVENOUS ONCE
Status: COMPLETED | OUTPATIENT
Start: 2024-03-29 | End: 2024-03-29

## 2024-03-29 RX ADMIN — POTASSIUM CHLORIDE 20 MEQ: 1.5 POWDER, FOR SOLUTION ORAL at 05:05

## 2024-03-29 RX ADMIN — KETOROLAC TROMETHAMINE 15 MG: 30 INJECTION, SOLUTION INTRAMUSCULAR at 05:21

## 2024-03-29 RX ADMIN — POTASSIUM CHLORIDE 20 MEQ: 14.9 INJECTION, SOLUTION INTRAVENOUS at 05:05

## 2024-03-29 ASSESSMENT — PAIN DESCRIPTION - LOCATION
LOCATION: CHEST

## 2024-03-29 ASSESSMENT — PAIN DESCRIPTION - DESCRIPTORS
DESCRIPTORS: SQUEEZING
DESCRIPTORS: SQUEEZING

## 2024-03-29 ASSESSMENT — PAIN DESCRIPTION - PAIN TYPE
TYPE: ACUTE PAIN
TYPE: ACUTE PAIN

## 2024-03-29 ASSESSMENT — PAIN SCALES - GENERAL
PAINLEVEL_OUTOF10: 4
PAINLEVEL_OUTOF10: 6

## 2024-03-29 ASSESSMENT — PAIN DESCRIPTION - ORIENTATION: ORIENTATION: LEFT;MID;UPPER

## 2024-03-29 ASSESSMENT — COLUMBIA-SUICIDE SEVERITY RATING SCALE - C-SSRS
1. IN THE PAST MONTH, HAVE YOU WISHED YOU WERE DEAD OR WISHED YOU COULD GO TO SLEEP AND NOT WAKE UP?: NO
2. HAVE YOU ACTUALLY HAD ANY THOUGHTS OF KILLING YOURSELF?: NO
6. HAVE YOU EVER DONE ANYTHING, STARTED TO DO ANYTHING, OR PREPARED TO DO ANYTHING TO END YOUR LIFE?: NO

## 2024-03-29 ASSESSMENT — PAIN DESCRIPTION - PROGRESSION: CLINICAL_PROGRESSION: NOT CHANGED

## 2024-03-29 ASSESSMENT — PAIN DESCRIPTION - FREQUENCY: FREQUENCY: CONSTANT/CONTINUOUS

## 2024-03-29 ASSESSMENT — PAIN - FUNCTIONAL ASSESSMENT
PAIN_FUNCTIONAL_ASSESSMENT: 0-10

## 2024-03-29 ASSESSMENT — PAIN DESCRIPTION - ONSET: ONSET: AWAKENED FROM SLEEP

## 2024-03-29 NOTE — ED TRIAGE NOTES
"Patient arrives to ED from home via POV with a primary complaint of a \"squeezing\" pain that woke her up from her sleep. The pain is \"4 out of 10\" and palliated positionally. Onset @ 0130 today. It is located in the min/upper/left region of the chest and non-radiating. Patient denies any dyspnea and is NOT currently experiencing any pain.  "

## 2024-03-29 NOTE — ED PROVIDER NOTES
"Chief Complaint   Patient presents with    Chest Pain     Intermittent, awoke with a \"squeezing pain\", positionally palliated, non-radiating.     HPI:   Albert Gonzales is an 43 y.o. female with a history of HLD, HTN, type 2 diabetes presenting with left-sided chest pain times a week.  She explains initially the pain was in her left upper chest and left anterior shoulder.  It was worsened when she would raise her hand up over her head or when she would move from a lying to sitting position she felt a midsternal squeezing.  The pain was nonradiating.  She explains that last night as she was trying to sleep she awoke to a sharp pain in the left side of her chest that was constant and not precipitated by positioning.  Describes the pain as 4 out of 10.  Does not have associated shortness of breath, headache, dizziness or lightheadedness.  Patient is currently not experiencing any pain at this time.  Did not experience any nausea or vomiting during these episodes.     Allergies   Allergen Reactions    Vancomycin Hives and Unknown   :  Past Medical History:   Diagnosis Date    Chlamydial infection, unspecified     Chlamydia    Complete or unspecified spontaneous  without complication         Maternal care for other (suspected) fetal abnormality and damage, fetal genitourinary anomalies, not applicable or unspecified     Ultrasound recheck of fetal pyelectasis, antepartum    Other conditions influencing health status     History of pregnancy    Personal history of gestational diabetes     History of gestational diabetes    Personal history of other complications of pregnancy, childbirth and the puerperium     History of spontaneous     Personal history of other endocrine, nutritional and metabolic disease     History of obesity    Personal history of other infectious and parasitic diseases     History of sexually transmitted disease    Personal history of other infectious and parasitic diseases     " History of trichomoniasis    Personal history of other specified conditions     History of headache    Smoking (tobacco) complicating pregnancy, unspecified trimester     Tobacco use in pregnancy     Past Surgical History:   Procedure Laterality Date    OTHER SURGICAL HISTORY  04/08/2014    Laparoscopy With Occlusion Of Oviducts By Device     Family History   Problem Relation Name Age of Onset    Other (cardiac disorder) Mother      Hypertension Mother      Leukemia Brother      Diabetes Other      Stroke Other          Physical Exam  Vitals and nursing note reviewed.   Constitutional:       General: She is not in acute distress.     Appearance: She is well-developed. She is obese. She is not toxic-appearing.   HENT:      Head: Normocephalic and atraumatic.   Eyes:      Extraocular Movements: Extraocular movements intact.      Conjunctiva/sclera: Conjunctivae normal.      Pupils: Pupils are equal, round, and reactive to light.   Cardiovascular:      Rate and Rhythm: Normal rate and regular rhythm.      Heart sounds: No murmur heard.  Pulmonary:      Effort: Pulmonary effort is normal. No respiratory distress.      Breath sounds: Normal breath sounds. No wheezing, rhonchi or rales.   Chest:      Chest wall: No mass or crepitus.      Comments: Tender over the upper pectoralis and anterior left shoulder.   Abdominal:      Palpations: Abdomen is soft.      Tenderness: There is no abdominal tenderness.   Musculoskeletal:         General: No swelling.      Cervical back: Normal range of motion and neck supple.      Right lower leg: No edema.      Left lower leg: No edema.      Comments: No pain on range of motion of the left shoulder.  No tenderness over the C-spine or paraspinal muscles   Skin:     General: Skin is warm and dry.      Capillary Refill: Capillary refill takes less than 2 seconds.   Neurological:      General: No focal deficit present.      Mental Status: She is alert and oriented to person, place, and  time.   Psychiatric:         Mood and Affect: Mood normal.        VS: As documented in the triage note and EMR flowsheet from this visit were reviewed.    EKG: Normal sinus rhythm at 70 bpm normal axis no ST segment changes T waves are normal  QTc 425      Medical Decision Making: This is a 43-year-old female presenting with left-sided chest pain for 1 week.  She explains that the pain was initially precipitated by position and movement and did wake her up in the night.  Differential includes MI, ACS, pneumothorax, angina, muscle strain, referred pain, radiculopathy, GERD, AAA.  On exam the patient was in no acute distress or discomfort she was sitting comfortably on the exam table.  She is articulate and a good historian.  PERRLA, EOMI, TMs are clear.  Posterior oropharynx clear.  Heart rate and rhythm is regular.  Lungs are clear to auscultation bilaterally.  Pain was mildly reproduced on exam at the left upper pectoralis and anterior shoulder.  Cardiac workup was initiated CBC was within normal limits, CMP did show hypokalemia with a potassium of 3.2.  Troponin series was negative troponin remained at 3 for both draws.  Viral swabs were negative.  Chest x-ray was within normal limits.    Patient was reassured by these results.  She was treated with Toradol and potassium was repleted and she was discharged home recommended outpatient follow up    Diagnoses as of 03/29/24 0521   Chest pain, unspecified type   Hypokalemia     Escalation of Care: Appropriate for outpatient     Social Determinants of Health: Poor health literacy     Prescription Drug Consideration: Considered anti-inflammatory prescription however patient does use this at home      Discussion of Management with Other Providers:  I discussed the patient/results with: Bello    Counseling: Spoke with the patient and discussed today´s findings, in addition to providing specific details for the plan of care and expected course.  Patient was given  the opportunity to ask questions.    Discussed return precautions and importance of follow-up.  Advised to follow-up with your PCP.    Advised to return to the ED for changing or worsening symptoms, new symptoms, complaint specific precautions, and precautions listed on the discharge paperwork.  Educated on the common potential side effects of medications prescribed.    I advised the patient that the emergency evaluation and treatment provided today doesn't end their need for medical care. It is very important that they follow-up with their primary care provider or other specialist as instructed.    The plan of care was mutually agreed upon with the patient. The patient and/or family were given the opportunity to ask questions. All questions asked today in the ED were answered to the best of my ability with today's information.    I specifically advised the patient to return to the ED for changing or worsening symptoms, worrisome new symptoms, or for any complaint specific precautions listed on the discharge paperwork.    This patient was cared for in the setting of nationwide stress on resources and staffing.    This report was transcribed using voice recognition software.  Every effort was made to ensure accuracy, however, inadvertently computerized transcription errors may be present.       Frederick Dela Cruz PA-C  03/29/24 0611

## 2024-04-03 ENCOUNTER — OFFICE VISIT (OUTPATIENT)
Dept: PRIMARY CARE | Facility: CLINIC | Age: 44
End: 2024-04-03
Payer: COMMERCIAL

## 2024-04-03 VITALS
WEIGHT: 220 LBS | DIASTOLIC BLOOD PRESSURE: 79 MMHG | BODY MASS INDEX: 32.58 KG/M2 | HEIGHT: 69 IN | HEART RATE: 80 BPM | SYSTOLIC BLOOD PRESSURE: 106 MMHG

## 2024-04-03 DIAGNOSIS — E11.69 TYPE 2 DIABETES MELLITUS WITH MORBID OBESITY (MULTI): ICD-10-CM

## 2024-04-03 DIAGNOSIS — E87.6 HYPOKALEMIA: Primary | ICD-10-CM

## 2024-04-03 DIAGNOSIS — E66.01 TYPE 2 DIABETES MELLITUS WITH MORBID OBESITY (MULTI): ICD-10-CM

## 2024-04-03 PROBLEM — O99.330 TOBACCO USE DURING PREGNANCY (HHS-HCC): Status: ACTIVE | Noted: 2024-04-03

## 2024-04-03 PROBLEM — A74.9 INFECTION DUE TO CHLAMYDIA SPECIES: Status: RESOLVED | Noted: 2024-04-03 | Resolved: 2024-04-03

## 2024-04-03 PROBLEM — Z86.19 HISTORY OF SEXUALLY TRANSMITTED DISEASE: Status: ACTIVE | Noted: 2024-04-03

## 2024-04-03 PROBLEM — O99.330 TOBACCO USE DURING PREGNANCY (HHS-HCC): Status: RESOLVED | Noted: 2024-04-03 | Resolved: 2024-04-03

## 2024-04-03 PROBLEM — L02.91 ABSCESS OF SKIN: Status: ACTIVE | Noted: 2024-04-03

## 2024-04-03 PROBLEM — R93.89 ABNORMAL COMPUTED TOMOGRAPHY SCAN: Status: RESOLVED | Noted: 2024-04-03 | Resolved: 2024-04-03

## 2024-04-03 PROBLEM — M20.10 VALGUS DEFORMITY OF GREAT TOE: Status: ACTIVE | Noted: 2024-04-03

## 2024-04-03 PROBLEM — A74.9 INFECTION DUE TO CHLAMYDIA SPECIES: Status: ACTIVE | Noted: 2024-04-03

## 2024-04-03 PROBLEM — S16.1XXA STRAIN OF NECK MUSCLE: Status: RESOLVED | Noted: 2024-04-03 | Resolved: 2024-04-03

## 2024-04-03 PROBLEM — R10.9 ABDOMINAL PAIN: Status: ACTIVE | Noted: 2024-04-03

## 2024-04-03 PROBLEM — R93.89 ABNORMAL COMPUTED TOMOGRAPHY SCAN: Status: ACTIVE | Noted: 2024-04-03

## 2024-04-03 PROBLEM — R10.9 ABDOMINAL PAIN: Status: RESOLVED | Noted: 2024-04-03 | Resolved: 2024-04-03

## 2024-04-03 PROBLEM — L02.91 ABSCESS OF SKIN: Status: RESOLVED | Noted: 2024-04-03 | Resolved: 2024-04-03

## 2024-04-03 PROBLEM — O03.9 ABORTION (HHS-HCC): Status: RESOLVED | Noted: 2024-04-03 | Resolved: 2024-04-03

## 2024-04-03 PROBLEM — S16.1XXA STRAIN OF NECK MUSCLE: Status: ACTIVE | Noted: 2024-04-03

## 2024-04-03 PROBLEM — Z86.39 HISTORY OF OBESITY: Status: ACTIVE | Noted: 2024-04-03

## 2024-04-03 PROBLEM — O03.9 ABORTION (HHS-HCC): Status: ACTIVE | Noted: 2024-04-03

## 2024-04-03 LAB
ANION GAP SERPL CALC-SCNC: 12 MMOL/L (ref 10–20)
BUN SERPL-MCNC: 12 MG/DL (ref 6–23)
CALCIUM SERPL-MCNC: 9.1 MG/DL (ref 8.6–10.6)
CHLORIDE SERPL-SCNC: 108 MMOL/L (ref 98–107)
CO2 SERPL-SCNC: 22 MMOL/L (ref 21–32)
CREAT SERPL-MCNC: 0.57 MG/DL (ref 0.5–1.05)
EGFRCR SERPLBLD CKD-EPI 2021: >90 ML/MIN/1.73M*2
GLUCOSE SERPL-MCNC: 101 MG/DL (ref 74–99)
POC HEMOGLOBIN A1C: 6.2 % (ref 4.2–6.5)
POTASSIUM SERPL-SCNC: 3.7 MMOL/L (ref 3.5–5.3)
SODIUM SERPL-SCNC: 138 MMOL/L (ref 136–145)

## 2024-04-03 PROCEDURE — 3074F SYST BP LT 130 MM HG: CPT | Performed by: NURSE PRACTITIONER

## 2024-04-03 PROCEDURE — 36415 COLL VENOUS BLD VENIPUNCTURE: CPT

## 2024-04-03 PROCEDURE — 3078F DIAST BP <80 MM HG: CPT | Performed by: NURSE PRACTITIONER

## 2024-04-03 PROCEDURE — 99214 OFFICE O/P EST MOD 30 MIN: CPT | Performed by: NURSE PRACTITIONER

## 2024-04-03 PROCEDURE — 83036 HEMOGLOBIN GLYCOSYLATED A1C: CPT | Performed by: NURSE PRACTITIONER

## 2024-04-03 PROCEDURE — 4010F ACE/ARB THERAPY RXD/TAKEN: CPT | Performed by: NURSE PRACTITIONER

## 2024-04-03 PROCEDURE — 4004F PT TOBACCO SCREEN RCVD TLK: CPT | Performed by: NURSE PRACTITIONER

## 2024-04-03 PROCEDURE — 80048 BASIC METABOLIC PNL TOTAL CA: CPT

## 2024-04-03 RX ORDER — LOSARTAN POTASSIUM 25 MG/1
25 TABLET ORAL DAILY
Qty: 90 TABLET | Refills: 3 | Status: SHIPPED | OUTPATIENT
Start: 2024-04-03 | End: 2025-03-29

## 2024-04-03 ASSESSMENT — ENCOUNTER SYMPTOMS
OCCASIONAL FEELINGS OF UNSTEADINESS: 0
LOSS OF SENSATION IN FEET: 0
DEPRESSION: 0

## 2024-04-03 NOTE — PROGRESS NOTES
Subjective   Patient ID: Albert Gonzales is a 43 y.o. female who presents for Diabetes.    HPI     1. ED follow up  Seen in the ED on 3/29/24 for left sided chest pain that had been present for a week  Pain was initially in her left upper check and left anterior shoulder, worse with raising her hand up or moving positions  Workup remarkable for potassium of 3.2 and that was repleted  She was treated with toradol and discharged home  Still has the pain intermittently to her whole chest, typically with moving her arms above her head or at night   Ibuprofen resolves the pain, she has been taking this once per day    2. T2DM  Last HgA1c: 6.8% on 1/3/24  Current meds: Jardiance 25 mg every day, metformin 1000 mg BID  Home glucose monitoring: multiple times per day   - High: 240  - Low: 57 (not often)  - Average: 90s  Diet: fair   Exercise: active   Statin: rosuvastatin 5 mg every day   ACEI: lisinopril 2.5 mg every day (has been getting a dry cough)  ASA: none  Last urine albumin: abnormal on 10/3/23  Diabetic foot exam: saw podiatry on 9/15/23   Vision exam: unknown, needs to schedule   Dental cleaning: years  Pneumovax: none     Reports burning to the bottom of her feet. Does mention that she got a pedicure a couple weeks ago and it got better. Denies blurry vision, polyuria, polydipsia, vision changes, yeast infections, or neuropathy.      Review of Systems  ROS negative except as noted above in HPI.     Objective   There were no vitals taken for this visit.    Physical Exam  General: Alert and oriented, in no acute distress. Appears stated age, well-nourished, and well hydrated  HEENT:  - Head: Normocephalic and atraumatic   - Eyes: EOMI, PERRLA  - ENT: Hearing grossly intact  Heart: RRR. No murmurs, clicks, or rubs  Lungs: Unlabored breathing. CTAB with no crackles, wheezes, or rhonchi  Abdomen: Normal BS in all 4 quadrants. Soft, non-tender, non-distended, with no masses  Extremities: Warm and well perfused. No edema.  Normal peripheral pulses  Musculoskeletal: Normal gait and station  Neurological: Alert and oriented. No gross neurological deficits  Psychological: Appropriate mood and affect  Skin: No rash, abnormal lesions, cyanosis, or erythema    Assessment/Plan   1. MSK chest pain  - Continue NSAIDs prn  - Can try ice  - Follow up if no better    2. T2DM  - IO HgA1c 6.2%  - Continue Jardiance 25 mg every day and Metformin 1000 mg BID  - Continue to monitor glucose at home  - Continue rosuvastatin 5 mg every day for ASCVD risk  - STOP lisinopril due to cough, switch to losartan 25 mg every day   - Urine albumin up to date  - Follows with podiatry; foot exam done in September 2023  - Schedule dental cleaning  - Schedule retinopathy screening, ophthalmology referral placed   - Recommend pneumococcal vaccine (will need to get at pharmacy)  - Lifestyle management    RTC in 3 months for physical with pap or sooner prn    MARCO ANTONIO Smith-CNP  [facility]

## 2024-04-03 NOTE — PROGRESS NOTES
Pt comes in for a cpe. Pt states she was at the [facility] last week for chest pains. Pt states it was not her heart. Pt also has feet pain.

## 2024-07-09 ENCOUNTER — APPOINTMENT (OUTPATIENT)
Dept: PRIMARY CARE | Facility: CLINIC | Age: 44
End: 2024-07-09
Payer: COMMERCIAL

## 2024-07-09 VITALS
DIASTOLIC BLOOD PRESSURE: 70 MMHG | SYSTOLIC BLOOD PRESSURE: 112 MMHG | WEIGHT: 228.8 LBS | BODY MASS INDEX: 33.79 KG/M2 | HEART RATE: 82 BPM

## 2024-07-09 DIAGNOSIS — E11.69 TYPE 2 DIABETES MELLITUS WITH MORBID OBESITY (MULTI): ICD-10-CM

## 2024-07-09 DIAGNOSIS — Z12.4 CERVICAL CANCER SCREENING: ICD-10-CM

## 2024-07-09 DIAGNOSIS — E66.01 TYPE 2 DIABETES MELLITUS WITH MORBID OBESITY (MULTI): ICD-10-CM

## 2024-07-09 DIAGNOSIS — E11.39 TYPE 2 DIABETES MELLITUS WITH OTHER OPHTHALMIC COMPLICATION, WITHOUT LONG-TERM CURRENT USE OF INSULIN (MULTI): ICD-10-CM

## 2024-07-09 DIAGNOSIS — Z00.00 ANNUAL PHYSICAL EXAM: Primary | ICD-10-CM

## 2024-07-09 DIAGNOSIS — Z12.31 ENCOUNTER FOR SCREENING MAMMOGRAM FOR MALIGNANT NEOPLASM OF BREAST: ICD-10-CM

## 2024-07-09 LAB
ALBUMIN SERPL BCP-MCNC: 4.1 G/DL (ref 3.4–5)
ALP SERPL-CCNC: 65 U/L (ref 33–110)
ALT SERPL W P-5'-P-CCNC: 12 U/L (ref 7–45)
ANION GAP SERPL CALC-SCNC: 13 MMOL/L (ref 10–20)
AST SERPL W P-5'-P-CCNC: 13 U/L (ref 9–39)
BILIRUB SERPL-MCNC: 0.6 MG/DL (ref 0–1.2)
BUN SERPL-MCNC: 12 MG/DL (ref 6–23)
CALCIUM SERPL-MCNC: 9.1 MG/DL (ref 8.6–10.6)
CHLORIDE SERPL-SCNC: 106 MMOL/L (ref 98–107)
CHOLEST SERPL-MCNC: 138 MG/DL (ref 0–199)
CHOLESTEROL/HDL RATIO: 2.9
CO2 SERPL-SCNC: 25 MMOL/L (ref 21–32)
CREAT SERPL-MCNC: 0.54 MG/DL (ref 0.5–1.05)
EGFRCR SERPLBLD CKD-EPI 2021: >90 ML/MIN/1.73M*2
GLUCOSE SERPL-MCNC: 104 MG/DL (ref 74–99)
HDLC SERPL-MCNC: 48.1 MG/DL
LDLC SERPL CALC-MCNC: 74 MG/DL
NON HDL CHOLESTEROL: 90 MG/DL (ref 0–149)
POC HEMOGLOBIN A1C: 6.3 % (ref 4.2–6.5)
POTASSIUM SERPL-SCNC: 3.7 MMOL/L (ref 3.5–5.3)
PROT SERPL-MCNC: 6.6 G/DL (ref 6.4–8.2)
SODIUM SERPL-SCNC: 140 MMOL/L (ref 136–145)
TRIGL SERPL-MCNC: 79 MG/DL (ref 0–149)
VLDL: 16 MG/DL (ref 0–40)

## 2024-07-09 PROCEDURE — 99213 OFFICE O/P EST LOW 20 MIN: CPT | Performed by: NURSE PRACTITIONER

## 2024-07-09 PROCEDURE — 4010F ACE/ARB THERAPY RXD/TAKEN: CPT | Performed by: NURSE PRACTITIONER

## 2024-07-09 PROCEDURE — 3078F DIAST BP <80 MM HG: CPT | Performed by: NURSE PRACTITIONER

## 2024-07-09 PROCEDURE — 4004F PT TOBACCO SCREEN RCVD TLK: CPT | Performed by: NURSE PRACTITIONER

## 2024-07-09 PROCEDURE — 88142 CYTOPATH C/V THIN LAYER: CPT

## 2024-07-09 PROCEDURE — 3074F SYST BP LT 130 MM HG: CPT | Performed by: NURSE PRACTITIONER

## 2024-07-09 PROCEDURE — 99396 PREV VISIT EST AGE 40-64: CPT | Performed by: NURSE PRACTITIONER

## 2024-07-09 PROCEDURE — 87591 N.GONORRHOEAE DNA AMP PROB: CPT

## 2024-07-09 PROCEDURE — 83036 HEMOGLOBIN GLYCOSYLATED A1C: CPT | Performed by: NURSE PRACTITIONER

## 2024-07-09 PROCEDURE — 80061 LIPID PANEL: CPT

## 2024-07-09 PROCEDURE — 87624 HPV HI-RISK TYP POOLED RSLT: CPT

## 2024-07-09 PROCEDURE — 87491 CHLMYD TRACH DNA AMP PROBE: CPT

## 2024-07-09 PROCEDURE — 36415 COLL VENOUS BLD VENIPUNCTURE: CPT

## 2024-07-09 PROCEDURE — 3048F LDL-C <100 MG/DL: CPT | Performed by: NURSE PRACTITIONER

## 2024-07-09 PROCEDURE — 87661 TRICHOMONAS VAGINALIS AMPLIF: CPT

## 2024-07-09 PROCEDURE — 80053 COMPREHEN METABOLIC PANEL: CPT

## 2024-07-09 RX ORDER — ROSUVASTATIN CALCIUM 5 MG/1
5 TABLET, COATED ORAL DAILY
Qty: 90 TABLET | Refills: 3 | Status: SHIPPED | OUTPATIENT
Start: 2024-07-09

## 2024-07-09 RX ORDER — LOSARTAN POTASSIUM 25 MG/1
25 TABLET ORAL DAILY
Qty: 90 TABLET | Refills: 3 | Status: SHIPPED | OUTPATIENT
Start: 2024-07-09 | End: 2025-07-04

## 2024-07-09 RX ORDER — METFORMIN HYDROCHLORIDE 500 MG/1
1000 TABLET, EXTENDED RELEASE ORAL
Qty: 360 TABLET | Refills: 3 | Status: SHIPPED | OUTPATIENT
Start: 2024-07-09 | End: 2025-07-04

## 2024-07-09 ASSESSMENT — ENCOUNTER SYMPTOMS
OCCASIONAL FEELINGS OF UNSTEADINESS: 0
LOSS OF SENSATION IN FEET: 0
DEPRESSION: 0

## 2024-07-09 NOTE — PROGRESS NOTES
Albert Gonzales is a 43 y.o. female who is presenting for annual physical exam with pap and chronic care.       1. T2DM  Last HgA1c: 6.2% on 4/3/24  Current meds: Jardiance 25 mg every day, metformin 1000 mg BID  Home glucose monitoring: multiple times per day   - High: 240  - Low: 64  - Average: 90s  Diet: fair   Exercise: active   Statin: rosuvastatin 5 mg every day   ACEI: losartan 25 mg every day   ASA: none  Last urine albumin: abnormal on 10/3/23  Diabetic foot exam: saw podiatry on 9/15/23   Vision exam: unknown, needs to schedule   Dental cleaning: years  Pneumovax: none     Denies blurry vision, polyuria, polydipsia, vision changes, yeast infections, or neuropathy.    Last  Visit: Unknown  Reported Health: Fair    Dental, Vision, Hearing:  Regular dental visits: no  - Brushes teeth 1-2 times per day  - Flosses? no  Vision problems: no  - Wears glasses or contacts? no  - Last eye exam: as noted above  Hearing loss: no    Immunization status:  Up to date: no    Lifestyle:  Healthy diet: as noted above  Regular exercise: as noted above  Alcohol: yes, socially  Tobacco: yes, less than 1/2 ppd cigarettes x 18 years  Marijuana: rarely   Drugs: no    Reproductive Health:  Menstrual problems: no  - LMP:  24  Sexually active: yes  Contraception: tubal ligation    Pregnancy History:   : 5  Parity: 2  - Full term: 2  - Premature:  - (s): 2  - Living:  - AB Induced:  - AB Spont: 1  - Ectopic:   - Multiple births:    Cervical Cancer Screening:  Last pap:  unknown  History of abnormal pap smear? yes  Breast Cancer Screening:  Last mammogram:  none  Colorectal Cancer Screening:  Last colonoscopy or Cologuard:  none  Metabolic Screening:  Lipid profile: 23    Review of Systems  Gen: denies fever, chills, weight loss, fatigue  HEENT: denies sinus pressure, sinus congestion, runny nose, red eyes, itchy eyes, vision loss, ear pain, hearing loss, throat pain, trouble swallowing  Neck: denies neck pain,  neck swelling or masses  Chest/breast: denies breast pain, breast lumps, nipple discharge  CV: denies chest pain, palpitations, fast heart rate, syncope  Resp: denies shortness of breath, cough, wheezing  GI: denies abdominal pain, nausea, diarrhea, constipation, hematochezia, melena  : denies dysuria, hematuria, vaginal discharge, frequency  Endo: denies polydipsia, polyuria, heat/cold intolerance, weight change, hair thinning  Heme: denies easy bruising, easy bleeding  Neuro: denies headache, numbness, tingling, memory loss, changes in vision  MSK: denies joint pain, joint swelling, weakness  Psych: denies suicidal ideation, homicidal ideation, depression, anxiety, mood swings  Skin: denies rashes, abnormal lesions, itching, changes in moles     Previous History  Past Medical History:   Diagnosis Date    Arthritis     Chlamydial infection, unspecified     Chlamydia    Complete or unspecified spontaneous  without complication ([facility])         Diabetes mellitus (Multi)     Maternal care for other (suspected) fetal abnormality and damage, fetal genitourinary anomalies, not applicable or unspecified ([facility])     Ultrasound recheck of fetal pyelectasis, antepartum    Other conditions influencing health status     History of pregnancy    Personal history of gestational diabetes     History of gestational diabetes    Personal history of other complications of pregnancy, childbirth and the puerperium     History of spontaneous     Personal history of other endocrine, nutritional and metabolic disease     History of obesity    Personal history of other infectious and parasitic diseases     History of sexually transmitted disease    Personal history of other infectious and parasitic diseases     History of trichomoniasis    Personal history of other specified conditions     History of headache    Smoking (tobacco) complicating pregnancy, unspecified trimester ([facility])     Tobacco use in  pregnancy    Urinary tract infection      Past Surgical History:   Procedure Laterality Date    OTHER SURGICAL HISTORY  04/08/2014    Laparoscopy With Occlusion Of Oviducts By Device    TUBAL LIGATION       Social History     Tobacco Use    Smoking status: Every Day     Current packs/day: 0.50     Average packs/day: 0.5 packs/day for 20.0 years (10.0 ttl pk-yrs)     Types: Cigarettes    Smokeless tobacco: Never   Substance Use Topics    Alcohol use: Yes     Comment: Socially    Drug use: Yes     Types: Marijuana     Comment: occasional     Family History   Problem Relation Name Age of Onset    Other (cardiac disorder) Mother Kizzy Berger     Hypertension Mother Kizzy Berger     Cancer Mother Kizzy Berger     Heart disease Mother Kizzy Berger     Leukemia Brother      Diabetes Other      Stroke Other      Alcohol abuse Father Tejinder Gonzales     Cancer Father Tejinder Gonzales     Drug abuse Father Tejinder Gonzales     Diabetes Maternal Grandmother Kristine Salomon     Asthma Daughter Hannah Skelton      Allergies   Allergen Reactions    Lisinopril Cough    Vancomycin Hives and Unknown     Current Outpatient Medications   Medication Instructions    empagliflozin (JARDIANCE) 25 mg, oral, Daily    FreeStyle Harry sensor system (FreeStyle Harry 2 Sensor) kit Test as needed.    losartan (COZAAR) 25 mg, oral, Daily    metFORMIN XR (GLUCOPHAGE-XR) 1,000 mg, oral, 2 times daily (morning and late afternoon), Do not crush, chew, or split.    rosuvastatin (CRESTOR) 5 mg, oral, Daily       Physical Exam  Chaperone was present for exam.     General: Alert and oriented, in no acute distress. Appears stated age, well-nourished, and well hydrated  HEENT:  - Head: Normocephalic and atraumatic   - Eyes: EOMI, PERRLA  - ENT: Hearing grossly intact. Mucus membranes pink and moist without lesions. Tonsils present without swelling or exudates. Good dentition. TMs gray  Neck: Supple. No stiffness. No thyromegaly or thyroid nodules  Heart: RRR. No  murmurs, clicks, or rubs  Lungs: Unlabored breathing. CTAB with no crackles, wheezes, or rhonchi  Abdomen: Normal BS in all 4 quadrants. Soft, non-tender, non-distended, with no masses  Genitourinary: Normal external genitalia without lesions. Vaginal mucosa pink without lesions. cervix without discharge. No CMT or adnexal tenderness  Extremities: Warm and well perfuses. No edema. Normal peripheral pulses  Musculoskeletal: ROM intact. Strength 5/5 in BUE and BLE. No joint swelling. Normal gait and station  Neurological: Alert and oriented. No gross neurological deficits. Normal sensation. No weakness. DTRs +2/4   Psychological: Appropriate mood and affect  Skin: No rash, abnormal lesions, cyanosis, or erythema      Assessment and Plan     1. T2DM  - IO HgA1c 6.3%  - Continue Jardiance 25 mg every day and Metformin 1000 mg BID  - Continue to monitor glucose at home  - Continue rosuvastatin 5 mg every day for ASCVD risk  - Continue losartan 25 mg every day for renoprotection  - Urine albumin up to date  - Follows with podiatry; foot exam done in September 2023  - Schedule dental cleaning  - Schedule retinopathy screening, ophthalmology referral placed   - Recommend pneumococcal vaccine (will need to get at pharmacy)  - Lifestyle management    2. Annual physical  - Due for pneumococcal, Tdap, Hep B vaccines (will need to get at pharmacy)  - Pap with co-testing done  - Mammogram ordered  - Due for colonoscopy at age 45 (mom with hx of colon CA in her 60s)  - Labs: lipid panel, CMP  - Maintain healthy lifestyle    MARCO ANTONIO Smith-CNP  [facility]

## 2024-07-11 LAB
C TRACH RRNA SPEC QL NAA+PROBE: NEGATIVE
N GONORRHOEA DNA SPEC QL PROBE+SIG AMP: NEGATIVE
T VAGINALIS RRNA SPEC QL NAA+PROBE: NEGATIVE

## 2024-07-14 ASSESSMENT — SLIT LAMP EXAM - LIDS
COMMENTS: GOOD POSITION
COMMENTS: GOOD POSITION

## 2024-07-14 ASSESSMENT — EXTERNAL EXAM - LEFT EYE: OS_EXAM: NORMAL

## 2024-07-14 ASSESSMENT — EXTERNAL EXAM - RIGHT EYE: OD_EXAM: NORMAL

## 2024-07-14 NOTE — PROGRESS NOTES
Diabetes  -HbA1c= 6.3 (7/9/24 POCT). No diabetic retinopathy seen on exam. Continue close monitoring of blood glucose, blood pressure, and cholesterol. Plan for annual dilated eye exam.    Myopia  Astigmatism  -New Rx given per patient request.      No history of intraocular surgery/refractive surgery.   No FH of AMD/glaucoma

## 2024-07-17 ENCOUNTER — APPOINTMENT (OUTPATIENT)
Dept: OPHTHALMOLOGY | Facility: CLINIC | Age: 44
End: 2024-07-17
Payer: COMMERCIAL

## 2024-07-17 DIAGNOSIS — H52.203 ASTIGMATISM OF BOTH EYES, UNSPECIFIED TYPE: ICD-10-CM

## 2024-07-17 DIAGNOSIS — H52.13 MYOPIA, BILATERAL: ICD-10-CM

## 2024-07-17 DIAGNOSIS — E11.9 DIABETES MELLITUS WITHOUT COMPLICATION (MULTI): Primary | ICD-10-CM

## 2024-07-17 PROCEDURE — 92004 COMPRE OPH EXAM NEW PT 1/>: CPT | Performed by: OPHTHALMOLOGY

## 2024-07-17 PROCEDURE — 92015 DETERMINE REFRACTIVE STATE: CPT | Performed by: OPHTHALMOLOGY

## 2024-07-17 ASSESSMENT — CONF VISUAL FIELD
OS_INFERIOR_NASAL_RESTRICTION: 0
OD_SUPERIOR_NASAL_RESTRICTION: 0
OS_NORMAL: 1
OS_SUPERIOR_TEMPORAL_RESTRICTION: 0
OD_NORMAL: 1
OD_INFERIOR_NASAL_RESTRICTION: 0
OD_SUPERIOR_TEMPORAL_RESTRICTION: 0
OS_INFERIOR_TEMPORAL_RESTRICTION: 0
OS_SUPERIOR_NASAL_RESTRICTION: 0
OD_INFERIOR_TEMPORAL_RESTRICTION: 0

## 2024-07-17 ASSESSMENT — ENCOUNTER SYMPTOMS
NEUROLOGICAL NEGATIVE: 0
PSYCHIATRIC NEGATIVE: 0
MUSCULOSKELETAL NEGATIVE: 0
RESPIRATORY NEGATIVE: 0
GASTROINTESTINAL NEGATIVE: 0
HEMATOLOGIC/LYMPHATIC NEGATIVE: 0
ALLERGIC/IMMUNOLOGIC NEGATIVE: 0
CONSTITUTIONAL NEGATIVE: 0
ENDOCRINE NEGATIVE: 0
EYES NEGATIVE: 1
CARDIOVASCULAR NEGATIVE: 0

## 2024-07-17 ASSESSMENT — VISUAL ACUITY
METHOD: SNELLEN - LINEAR
OD_SC+: -1
OD_SC: 20/25
OS_SC: 20/25

## 2024-07-17 ASSESSMENT — REFRACTION_MANIFEST
OS_CYLINDER: -2.25
OD_SPHERE: -0.75
OS_SPHERE: -0.50
OD_CYLINDER: -2.50
OS_CYLINDER: -2.25
OD_CYLINDER: -4.50
OS_AXIS: 180
OD_AXIS: 175
METHOD_AUTOREFRACTION: 1
OD_AXIS: 180
OD_SPHERE: PLANO
OS_AXIS: 175
OS_SPHERE: -0.50

## 2024-07-17 ASSESSMENT — TONOMETRY
OD_IOP_MMHG: 16
OS_IOP_MMHG: 16
IOP_METHOD: GOLDMANN APPLANATION

## 2024-07-17 ASSESSMENT — CUP TO DISC RATIO
OS_RATIO: 0.35
OD_RATIO: 0.35

## 2024-07-22 LAB
CYTOLOGY CMNT CVX/VAG CYTO-IMP: NORMAL
HPV HR 12 DNA GENITAL QL NAA+PROBE: NEGATIVE
HPV HR GENOTYPES PNL CVX NAA+PROBE: NEGATIVE
HPV16 DNA SPEC QL NAA+PROBE: NEGATIVE
HPV18 DNA SPEC QL NAA+PROBE: NEGATIVE
LAB AP HPV GENOTYPE QUESTION: YES
LAB AP HPV HR: NORMAL
LAB AP PAP ADDITIONAL TESTS: NORMAL
LABORATORY COMMENT REPORT: NORMAL
LABORATORY COMMENT REPORT: NORMAL
PATH REPORT.TOTAL CANCER: NORMAL

## 2024-07-23 ENCOUNTER — HOSPITAL ENCOUNTER (OUTPATIENT)
Dept: RADIOLOGY | Facility: CLINIC | Age: 44
Discharge: HOME | End: 2024-07-23
Payer: COMMERCIAL

## 2024-07-23 VITALS — BODY MASS INDEX: 33.96 KG/M2 | WEIGHT: 229.28 LBS | HEIGHT: 69 IN

## 2024-07-23 DIAGNOSIS — Z12.31 ENCOUNTER FOR SCREENING MAMMOGRAM FOR MALIGNANT NEOPLASM OF BREAST: ICD-10-CM

## 2024-07-23 PROCEDURE — 77067 SCR MAMMO BI INCL CAD: CPT

## 2024-07-23 PROCEDURE — 77063 BREAST TOMOSYNTHESIS BI: CPT | Performed by: RADIOLOGY

## 2024-07-23 PROCEDURE — 77067 SCR MAMMO BI INCL CAD: CPT | Performed by: RADIOLOGY

## 2024-07-30 DIAGNOSIS — N63.20 MASS OF LEFT BREAST, UNSPECIFIED QUADRANT: Primary | ICD-10-CM

## 2024-07-31 ENCOUNTER — HOSPITAL ENCOUNTER (OUTPATIENT)
Dept: RADIOLOGY | Facility: CLINIC | Age: 44
Discharge: HOME | End: 2024-07-31
Payer: COMMERCIAL

## 2024-07-31 DIAGNOSIS — N63.20 MASS OF LEFT BREAST, UNSPECIFIED QUADRANT: ICD-10-CM

## 2024-07-31 PROCEDURE — 76642 ULTRASOUND BREAST LIMITED: CPT | Mod: LEFT SIDE | Performed by: RADIOLOGY

## 2024-07-31 PROCEDURE — 76642 ULTRASOUND BREAST LIMITED: CPT | Mod: LT

## 2024-08-30 DIAGNOSIS — E11.9 TYPE 2 DIABETES MELLITUS WITHOUT COMPLICATION, WITHOUT LONG-TERM CURRENT USE OF INSULIN (MULTI): ICD-10-CM

## 2024-08-30 RX ORDER — FLASH GLUCOSE SENSOR
KIT MISCELLANEOUS
Qty: 6 EACH | Refills: 3 | Status: SHIPPED | OUTPATIENT
Start: 2024-08-30

## 2025-07-17 DIAGNOSIS — E66.01 TYPE 2 DIABETES MELLITUS WITH MORBID OBESITY: ICD-10-CM

## 2025-07-17 DIAGNOSIS — E11.39 TYPE 2 DIABETES MELLITUS WITH OTHER OPHTHALMIC COMPLICATION, WITHOUT LONG-TERM CURRENT USE OF INSULIN: ICD-10-CM

## 2025-07-17 DIAGNOSIS — E11.69 TYPE 2 DIABETES MELLITUS WITH MORBID OBESITY: ICD-10-CM

## 2025-07-17 RX ORDER — ROSUVASTATIN CALCIUM 5 MG/1
5 TABLET, COATED ORAL DAILY
Qty: 90 TABLET | Refills: 3 | OUTPATIENT
Start: 2025-07-17

## 2025-07-17 RX ORDER — EMPAGLIFLOZIN 25 MG/1
25 TABLET, FILM COATED ORAL DAILY
Qty: 90 TABLET | Refills: 3 | OUTPATIENT
Start: 2025-07-17

## 2025-09-02 ENCOUNTER — TELEPHONE (OUTPATIENT)
Dept: PRIMARY CARE | Facility: CLINIC | Age: 45
End: 2025-09-02
Payer: COMMERCIAL

## 2025-09-02 DIAGNOSIS — E11.69 TYPE 2 DIABETES MELLITUS WITH MORBID OBESITY: ICD-10-CM

## 2025-09-02 DIAGNOSIS — E11.39 TYPE 2 DIABETES MELLITUS WITH OTHER OPHTHALMIC COMPLICATION, WITHOUT LONG-TERM CURRENT USE OF INSULIN: ICD-10-CM

## 2025-09-02 DIAGNOSIS — E66.01 TYPE 2 DIABETES MELLITUS WITH MORBID OBESITY: ICD-10-CM

## 2025-09-02 RX ORDER — ROSUVASTATIN CALCIUM 5 MG/1
5 TABLET, COATED ORAL DAILY
Qty: 90 TABLET | Refills: 3 | Status: SHIPPED | OUTPATIENT
Start: 2025-09-02

## 2025-09-02 RX ORDER — METFORMIN HYDROCHLORIDE 500 MG/1
1000 TABLET, EXTENDED RELEASE ORAL
Qty: 360 TABLET | Refills: 3 | Status: SHIPPED | OUTPATIENT
Start: 2025-09-02 | End: 2026-08-28

## 2025-09-02 RX ORDER — LOSARTAN POTASSIUM 25 MG/1
25 TABLET ORAL DAILY
Qty: 90 TABLET | Refills: 3 | Status: SHIPPED | OUTPATIENT
Start: 2025-09-02 | End: 2026-08-28

## 2025-11-05 ENCOUNTER — APPOINTMENT (OUTPATIENT)
Dept: PRIMARY CARE | Facility: CLINIC | Age: 45
End: 2025-11-05
Payer: COMMERCIAL